# Patient Record
Sex: MALE | Race: WHITE | Employment: PART TIME | URBAN - METROPOLITAN AREA
[De-identification: names, ages, dates, MRNs, and addresses within clinical notes are randomized per-mention and may not be internally consistent; named-entity substitution may affect disease eponyms.]

---

## 2017-01-31 ENCOUNTER — TELEPHONE (OUTPATIENT)
Dept: CARDIOLOGY CLINIC | Age: 77
End: 2017-01-31

## 2017-01-31 RX ORDER — ATORVASTATIN CALCIUM 10 MG/1
TABLET, FILM COATED ORAL
Qty: 45 TABLET | Refills: 3 | Status: SHIPPED | OUTPATIENT
Start: 2017-01-31 | End: 2017-12-28 | Stop reason: SDUPTHER

## 2017-02-15 RX ORDER — LISINOPRIL 20 MG/1
TABLET ORAL
Qty: 90 TABLET | Refills: 3 | Status: SHIPPED | OUTPATIENT
Start: 2017-02-15 | End: 2017-03-09 | Stop reason: SDUPTHER

## 2017-03-09 ENCOUNTER — TELEPHONE (OUTPATIENT)
Dept: CARDIOLOGY CLINIC | Age: 77
End: 2017-03-09

## 2017-03-09 RX ORDER — LISINOPRIL 20 MG/1
TABLET ORAL
Qty: 90 TABLET | Refills: 3 | Status: SHIPPED | OUTPATIENT
Start: 2017-03-09 | End: 2017-11-28 | Stop reason: SDUPTHER

## 2017-04-25 ENCOUNTER — TELEPHONE (OUTPATIENT)
Dept: CARDIOLOGY CLINIC | Age: 77
End: 2017-04-25

## 2017-04-27 RX ORDER — CLOPIDOGREL BISULFATE 75 MG/1
TABLET ORAL
Qty: 90 TABLET | Refills: 2 | Status: SHIPPED | OUTPATIENT
Start: 2017-04-27 | End: 2018-02-01 | Stop reason: SDUPTHER

## 2017-10-05 NOTE — TELEPHONE ENCOUNTER
Last ov 12/15/16  Pending appt due in december  Last refill 11/16/16 #180x3  Last labs 12/15/16  Last ekg 12/15/16

## 2017-11-28 PROBLEM — K46.0 INCARCERATED HERNIA: Status: ACTIVE | Noted: 2017-11-28

## 2017-12-14 ENCOUNTER — OFFICE VISIT (OUTPATIENT)
Dept: FAMILY MEDICINE CLINIC | Age: 77
End: 2017-12-14

## 2017-12-14 VITALS
BODY MASS INDEX: 21.4 KG/M2 | RESPIRATION RATE: 14 BRPM | OXYGEN SATURATION: 80 % | HEART RATE: 79 BPM | DIASTOLIC BLOOD PRESSURE: 60 MMHG | HEIGHT: 72 IN | WEIGHT: 158 LBS | SYSTOLIC BLOOD PRESSURE: 100 MMHG | TEMPERATURE: 98.6 F

## 2017-12-14 DIAGNOSIS — K40.30 INCARCERATED INGUINAL HERNIA: Primary | ICD-10-CM

## 2017-12-14 DIAGNOSIS — M19.90 ARTHRITIS: ICD-10-CM

## 2017-12-14 PROCEDURE — G8484 FLU IMMUNIZE NO ADMIN: HCPCS | Performed by: FAMILY MEDICINE

## 2017-12-14 PROCEDURE — G8427 DOCREV CUR MEDS BY ELIG CLIN: HCPCS | Performed by: FAMILY MEDICINE

## 2017-12-14 PROCEDURE — 4040F PNEUMOC VAC/ADMIN/RCVD: CPT | Performed by: FAMILY MEDICINE

## 2017-12-14 PROCEDURE — 1111F DSCHRG MED/CURRENT MED MERGE: CPT | Performed by: FAMILY MEDICINE

## 2017-12-14 PROCEDURE — G8420 CALC BMI NORM PARAMETERS: HCPCS | Performed by: FAMILY MEDICINE

## 2017-12-14 PROCEDURE — 99213 OFFICE O/P EST LOW 20 MIN: CPT | Performed by: FAMILY MEDICINE

## 2017-12-14 ASSESSMENT — PATIENT HEALTH QUESTIONNAIRE - PHQ9
SUM OF ALL RESPONSES TO PHQ9 QUESTIONS 1 & 2: 0
2. FEELING DOWN, DEPRESSED OR HOPELESS: 0
1. LITTLE INTEREST OR PLEASURE IN DOING THINGS: 0
SUM OF ALL RESPONSES TO PHQ QUESTIONS 1-9: 0

## 2017-12-14 NOTE — PROGRESS NOTES
158 lb (71.7 kg)   SpO2 (!) 80%   BMI 21.43 kg/m²     Physical Exam   General:  Well-appearing, NAD, alert, non-toxic  HEENT:  Normocephalic, atraumatic. Pupils equal and round. CHEST/LUNGS: CTAB, no crackles, no wheeze, no rhonchi. Symmetric rise  CARDIOVASCULAR: RRR,  no murmur, no rub  EXTREMETIES: Normal movement of all extremities  SKIN:  +healing 6cm incision in R groin. No drainage or surrounding erythema  PSYCH:  A+O x 3; normal affect  NEURO:  GCS 15, CN2-12 grossly intact, no focal motor/sensory deficits, no cerebellar deficits, +antalgic gait, normal speech        Assessment/Plan     76-year-old male here for postop examination for right inguinal hernia repair. Postop course seems appropriate with pain improving steadily. Patient has left hip pain which hurts with walking. I offered to refer the patient to or so, the patient declined. Does not want further workup of the hip. I will give him a handicap parking placard. Tylenol or Motrin as needed for pain or fever  Advise to return here if worse or go to nearest ER  Encourage fluids  Pt discharged in stable condition at 14:06      1. Incarcerated inguinal hernia      2. Arthritis        No orders of the defined types were placed in this encounter. No Follow-up on file.     Misha Torres MD    12/14/2017  2:03 PM

## 2017-12-21 ENCOUNTER — OFFICE VISIT (OUTPATIENT)
Dept: SURGERY | Age: 77
End: 2017-12-21

## 2017-12-21 VITALS — DIASTOLIC BLOOD PRESSURE: 62 MMHG | BODY MASS INDEX: 21.43 KG/M2 | WEIGHT: 158 LBS | SYSTOLIC BLOOD PRESSURE: 120 MMHG

## 2017-12-21 DIAGNOSIS — K46.0 INCARCERATED HERNIA: Primary | ICD-10-CM

## 2017-12-21 DIAGNOSIS — K56.609 SBO (SMALL BOWEL OBSTRUCTION) (HCC): ICD-10-CM

## 2017-12-21 PROCEDURE — 99024 POSTOP FOLLOW-UP VISIT: CPT | Performed by: SURGERY

## 2017-12-21 NOTE — LETTER
[facility]  [address]  Phone: [phone]  Fax: [phone]    Ameena Butler MD        December 21, 2017     Patient: Brady Casillas   YOB: 1940   Date of Visit: 12/21/2017       To Whom It May Concern:     Patrice Hannon had a surgical procedure on 12-4-17. He was seen for a post operative appointment on 12-21-17. He may return back to work on 2-5-18 with the following restrictions. No lifting, tugging, pushing, or pulling over 10 pounds until 3-1-18. If you have any questions or concerns, please don't hesitate to call.     Sincerely,          Ameena Butler MD

## 2017-12-28 RX ORDER — ATORVASTATIN CALCIUM 10 MG/1
TABLET, FILM COATED ORAL
Qty: 39 TABLET | Refills: 5 | Status: ON HOLD | OUTPATIENT
Start: 2017-12-28 | End: 2018-01-23 | Stop reason: HOSPADM

## 2018-01-10 ENCOUNTER — TELEPHONE (OUTPATIENT)
Dept: ORTHOPEDIC SURGERY | Age: 78
End: 2018-01-10

## 2018-01-10 PROBLEM — S72.002A LEFT DISPLACED FEMORAL NECK FRACTURE (HCC): Status: ACTIVE | Noted: 2018-01-10

## 2018-01-15 PROBLEM — S72.92XA CLOSED FRACTURE OF LEFT FEMUR (HCC): Status: ACTIVE | Noted: 2018-01-15

## 2018-01-25 ENCOUNTER — CARE COORDINATION (OUTPATIENT)
Dept: CASE MANAGEMENT | Age: 78
End: 2018-01-25

## 2018-01-30 ENCOUNTER — CARE COORDINATION (OUTPATIENT)
Dept: CASE MANAGEMENT | Age: 78
End: 2018-01-30

## 2018-02-01 RX ORDER — CLOPIDOGREL BISULFATE 75 MG/1
TABLET ORAL
Qty: 90 TABLET | Refills: 0 | Status: SHIPPED | OUTPATIENT
Start: 2018-02-01 | End: 2018-05-07 | Stop reason: SDUPTHER

## 2018-02-09 ENCOUNTER — OFFICE VISIT (OUTPATIENT)
Dept: ORTHOPEDIC SURGERY | Age: 78
End: 2018-02-09

## 2018-02-09 ENCOUNTER — CARE COORDINATION (OUTPATIENT)
Dept: CASE MANAGEMENT | Age: 78
End: 2018-02-09

## 2018-02-09 VITALS
SYSTOLIC BLOOD PRESSURE: 130 MMHG | DIASTOLIC BLOOD PRESSURE: 76 MMHG | BODY MASS INDEX: 21.4 KG/M2 | WEIGHT: 158 LBS | HEIGHT: 72 IN | HEART RATE: 84 BPM

## 2018-02-09 DIAGNOSIS — Z87.81 HISTORY OF FEMUR FRACTURE: ICD-10-CM

## 2018-02-09 DIAGNOSIS — Z96.642 STATUS POST TOTAL REPLACEMENT OF LEFT HIP: Primary | ICD-10-CM

## 2018-02-09 PROCEDURE — 99024 POSTOP FOLLOW-UP VISIT: CPT | Performed by: ORTHOPAEDIC SURGERY

## 2018-02-09 RX ORDER — OXYCODONE HYDROCHLORIDE AND ACETAMINOPHEN 5; 325 MG/1; MG/1
TABLET ORAL
Qty: 25 TABLET | Refills: 0 | Status: SHIPPED | OUTPATIENT
Start: 2018-02-09 | End: 2018-02-23

## 2018-02-09 NOTE — PROGRESS NOTES
[facility]      At this time, he will continue physical therapy and transition to outpatient physical therapy in 2 weeks. He can progress to 100% weightbearing on the left leg. He is encouraged to continue to progress to a cane but is advised to continue using a walker if he feels it is necessary to be safe. He will be given a refill on his Percocet. Follow up will be in in 4 week(s) and an AP pelvis and 2 views of the left hip will be obtained. He understands and accepts this course of care     During this examination, BARNEY Singh PA-C, functioned as a scribe for Dr. Nemo Meneses. The history taking and physical examination were performed by Dr. Nemo Meneses. The appointment was performed between the patient and Dr. Nemo Meneses. The above encounter was performed by me personally with Robbie Singh PA-C serving solely as a scribe. The above note is an accurate reflection of that encounter and has been reviewed for content by me. Chantelle Yang, [address] [place] [address] and Sports Medicine  02/09/18  10:27 PM

## 2018-02-09 NOTE — LETTER
[address] [address]  Phone: [phone]  Fax: [phone]    No ref. provider found        February 9, 2018       Patient: Deion Shah   MR Number: [id number]   YOB: 1940   Date of Visit: 2/9/2018       Dear Dr. Vandana Kumar ref. provider found: Thank you for the request for consultation for Quentin Stacy to me for the evaluation of left hip replacement for fracture. Below are the relevant portions of my assessment and plan of care. If you have questions, please do not hesitate to call me. I look forward to following Sur[facility] along with you. Sincerely,        Myrna Hyatt MD    CC providers:  Velasquez Chao MD  [address]

## 2018-02-09 NOTE — CARE COORDINATION
[facility] Transitions Follow Up Call    2018    Patient: Andrew Diaz  Patient : 1940   MRN: [id number]  Reason for Admission: left SG  Discharge Date: 18 RARS: Risk Score: 20.75     Follow up [address] call attempted, left contact info on vm      Follow Up  Future Appointments  Date Time Provider Maia Noel   3/9/2018 11:00 AM Casie Calle MD [facility] HUSSEIN Sanz RN

## 2018-02-13 ENCOUNTER — CARE COORDINATION (OUTPATIENT)
Dept: CASE MANAGEMENT | Age: 78
End: 2018-02-13

## 2018-02-15 RX ORDER — LISINOPRIL 20 MG/1
TABLET ORAL
Qty: 90 TABLET | Refills: 0 | Status: SHIPPED | OUTPATIENT
Start: 2018-02-15 | End: 2019-01-11

## 2018-03-01 ENCOUNTER — CARE COORDINATION (OUTPATIENT)
Dept: CASE MANAGEMENT | Age: 78
End: 2018-03-01

## 2018-03-01 NOTE — CARE COORDINATION
[facility] Transitions Follow Up Call    3/1/2018    Patient: Andrew Diaz  Patient : 1940   MRN: [id number]  Reason for Admission: Left GS  Discharge Date: 18 RARS: Risk Score: 20.75       Follow up [address] call attempted, left contact info on vm      Follow Up  Future Appointments  Date Time Provider Maia Noel   3/8/2018 2:30 PM Seema Knee, PT [facility] PT None   3/13/2018 1:15 PM Casie Calle MD [facility] HUSSEIN Sanz RN

## 2018-03-13 ENCOUNTER — OFFICE VISIT (OUTPATIENT)
Dept: ORTHOPEDIC SURGERY | Age: 78
End: 2018-03-13

## 2018-03-13 VITALS
DIASTOLIC BLOOD PRESSURE: 73 MMHG | SYSTOLIC BLOOD PRESSURE: 137 MMHG | BODY MASS INDEX: 22.05 KG/M2 | HEIGHT: 72 IN | HEART RATE: 60 BPM | WEIGHT: 162.8 LBS

## 2018-03-13 DIAGNOSIS — Z87.81 HISTORY OF FEMUR FRACTURE: ICD-10-CM

## 2018-03-13 DIAGNOSIS — Z96.642 STATUS POST TOTAL REPLACEMENT OF LEFT HIP: Primary | ICD-10-CM

## 2018-03-13 PROCEDURE — 99024 POSTOP FOLLOW-UP VISIT: CPT | Performed by: PHYSICIAN ASSISTANT

## 2018-03-13 NOTE — PROGRESS NOTES
Patient Name: Kia Mariscal  Medical Record Number: [id number]  YOB: 1940  Date of Encounter: 3/13/2018    Chief Complaint   Patient presents with    Post-Op Check     Left SG follow up. Doing well, hip is still sore often. Total Hip Follow-up  Patient here for 8 weeks post left direct anterior total hip arthroplasty following a left hip intertrochanteric fracture with severe hip osteoarthritis on 1/12/2018. Pain is controlled without any medications. The patient denies  fever, wound drainage, increasing redness, pus, increasing pain, increasing swelling. Post op problems reported: none. He is ambulating with the assistance of a cane and states he does sometimes still using a walker. His hip fracture was secondary to a syncopal episode and fall. He was in the hospital for several days following the operation. He states he was doing at home physical therapy until 2 weeks ago. He has since canceled to outpatient physical therapy sessions and his wife states he does not feel he needs to go. She has been trying to talk him into going. He states surgical wound is well-healed. He denies numbness or tingling in the left leg. Patient is still taking Vitamin D supplementation. DVT prophylaxis is completed. The patient's  past medical history, medications, allergies,  family history, social history, and review of systems have been reviewed, and dated and are recorded in the chart under the 'MEDIA\" tab. Physical Exam:   Mr. Kia Mariscal appears well, he is in no apparent distress, he demonstrates appropriate mood & affect. He is alert and oriented to person, place and time. /73   Pulse 60   Ht 6' (1.829 m)   Wt 162 lb 12.8 oz (73.8 kg)   BMI 22.08 kg/m²     Left Hip: He has minimal swelling. The incision is clean, dry, intact and nontender and without erythema, induration or warmth.  Range of motion is: 40 degrees abduction, 95 degrees flexion, 35 degrees internal rotation

## 2018-03-13 NOTE — LETTER
[address] [address]  Phone: [phone]  Fax: [phone]     Dr. Gareth Ríos     March 13, 2018       Patient: Ray Coreas   MR Number: [id number]   YOB: 1940   Date of Visit: 3/13/2018       Dear Dr. Gareth Ríos    Thank you for the request for consultation for Jose Salinas to me for the evaluation of his left hip fracture with subsequent total hip arthroplasty. Below are the relevant portions of my assessment and plan of care. If you have questions, please do not hesitate to call me. I look forward to following [facility] along with you. Sincerely,    Dr. Ely Torres PA-C    CC providers:  Nadia Peck MD  [address]

## 2018-03-14 ENCOUNTER — CARE COORDINATION (OUTPATIENT)
Dept: CASE MANAGEMENT | Age: 78
End: 2018-03-14

## 2018-03-23 ENCOUNTER — CARE COORDINATION (OUTPATIENT)
Dept: CASE MANAGEMENT | Age: 78
End: 2018-03-23

## 2018-04-09 ENCOUNTER — CARE COORDINATION (OUTPATIENT)
Dept: CASE MANAGEMENT | Age: 78
End: 2018-04-09

## 2018-05-07 RX ORDER — CLOPIDOGREL BISULFATE 75 MG/1
TABLET ORAL
Qty: 90 TABLET | Refills: 0 | Status: SHIPPED | OUTPATIENT
Start: 2018-05-07 | End: 2018-08-05 | Stop reason: SDUPTHER

## 2018-05-07 RX ORDER — LISINOPRIL 20 MG/1
TABLET ORAL
Qty: 90 TABLET | Refills: 0 | Status: SHIPPED | OUTPATIENT
Start: 2018-05-07 | End: 2019-01-11

## 2018-08-06 RX ORDER — CLOPIDOGREL BISULFATE 75 MG/1
TABLET ORAL
Qty: 90 TABLET | Refills: 5 | Status: SHIPPED | OUTPATIENT
Start: 2018-08-06

## 2018-08-27 RX ORDER — LISINOPRIL 20 MG/1
TABLET ORAL
Qty: 90 TABLET | OUTPATIENT
Start: 2018-08-27

## 2018-10-10 RX ORDER — LISINOPRIL 20 MG/1
TABLET ORAL
Qty: 90 TABLET | Refills: 0 | Status: SHIPPED | OUTPATIENT
Start: 2018-10-10 | End: 2019-01-11 | Stop reason: SDUPTHER

## 2018-12-12 ENCOUNTER — TELEPHONE (OUTPATIENT)
Dept: CARDIOLOGY CLINIC | Age: 78
End: 2018-12-12

## 2019-01-11 RX ORDER — LISINOPRIL 20 MG/1
TABLET ORAL
Qty: 90 TABLET | Refills: 1 | Status: SHIPPED | OUTPATIENT
Start: 2019-01-11

## 2019-05-03 DIAGNOSIS — I10 ESSENTIAL HYPERTENSION: Primary | ICD-10-CM

## 2019-05-03 NOTE — TELEPHONE ENCOUNTER
Has canceled all follow up appts after medications have been refilled to the pharmacy --- was told on all refills needs to be seen     Last appt was 12/15/2016    Received a medication refill by faxed from [address] [place] [address] for Prinivil 20mg  Denied due to pt needing an office visit and faxed back. .     Please advise if you would like for him to have a 10 day supply or 30 day until he is seen in office

## 2019-05-03 NOTE — TELEPHONE ENCOUNTER
Discussed with dgb.he needs fasting LLL prior to getting a refill. If his labs are ok, will give him a refill. Needs to make an appointment, and be seen to get any further refills from this office.  Would advise he make an appointment now to be seen  dgb/MM

## 2024-05-28 ENCOUNTER — APPOINTMENT (OUTPATIENT)
Dept: GENERAL RADIOLOGY | Age: 84
End: 2024-05-28
Payer: MEDICARE

## 2024-05-28 ENCOUNTER — APPOINTMENT (OUTPATIENT)
Dept: CT IMAGING | Age: 84
End: 2024-05-28
Payer: MEDICARE

## 2024-05-28 ENCOUNTER — HOSPITAL ENCOUNTER (INPATIENT)
Age: 84
LOS: 3 days | Discharge: HOSPICE | End: 2024-05-31
Attending: INTERNAL MEDICINE | Admitting: INTERNAL MEDICINE
Payer: MEDICARE

## 2024-05-28 ENCOUNTER — APPOINTMENT (OUTPATIENT)
Dept: ULTRASOUND IMAGING | Age: 84
End: 2024-05-28
Payer: MEDICARE

## 2024-05-28 DIAGNOSIS — R29.6 FREQUENT FALLS: ICD-10-CM

## 2024-05-28 DIAGNOSIS — R74.01 TRANSAMINITIS: ICD-10-CM

## 2024-05-28 DIAGNOSIS — R53.1 GENERALIZED WEAKNESS: Primary | ICD-10-CM

## 2024-05-28 DIAGNOSIS — Z51.5 HOSPICE CARE: ICD-10-CM

## 2024-05-28 DIAGNOSIS — K83.8 DILATION OF BILIARY TRACT: ICD-10-CM

## 2024-05-28 PROBLEM — J18.9 PNEUMONIA: Status: ACTIVE | Noted: 2024-05-28

## 2024-05-28 PROBLEM — N39.0 UTI (URINARY TRACT INFECTION): Status: ACTIVE | Noted: 2024-05-28

## 2024-05-28 PROBLEM — A41.9 SEPSIS (HCC): Status: ACTIVE | Noted: 2024-05-28

## 2024-05-28 PROBLEM — K81.9 CHOLECYSTITIS: Status: ACTIVE | Noted: 2024-05-28

## 2024-05-28 PROBLEM — F03.90 DEMENTIA (HCC): Status: ACTIVE | Noted: 2024-05-28

## 2024-05-28 LAB
ALBUMIN SERPL-MCNC: 3.2 G/DL (ref 3.4–5)
ALBUMIN/GLOB SERPL: 1.1 {RATIO} (ref 1.1–2.2)
ALP SERPL-CCNC: 542 U/L (ref 40–129)
ALT SERPL-CCNC: 96 U/L (ref 10–40)
AMMONIA PLAS-SCNC: NORMAL UMOL/L (ref 16–60)
ANION GAP SERPL CALCULATED.3IONS-SCNC: 13 MMOL/L (ref 3–16)
AST SERPL-CCNC: 102 U/L (ref 15–37)
BACTERIA URNS QL MICRO: ABNORMAL /HPF
BASOPHILS # BLD: 0 K/UL (ref 0–0.2)
BASOPHILS NFR BLD: 0.2 %
BILIRUB SERPL-MCNC: 4.6 MG/DL (ref 0–1)
BILIRUB UR QL STRIP.AUTO: ABNORMAL
BUN SERPL-MCNC: 14 MG/DL (ref 7–20)
CALCIUM SERPL-MCNC: 8.7 MG/DL (ref 8.3–10.6)
CHLORIDE SERPL-SCNC: 100 MMOL/L (ref 99–110)
CLARITY UR: CLEAR
CO2 SERPL-SCNC: 24 MMOL/L (ref 21–32)
COLOR UR: ABNORMAL
CREAT SERPL-MCNC: <0.5 MG/DL (ref 0.8–1.3)
DEPRECATED RDW RBC AUTO: 17.3 % (ref 12.4–15.4)
EOSINOPHIL # BLD: 0 K/UL (ref 0–0.6)
EOSINOPHIL NFR BLD: 0 %
EPI CELLS #/AREA URNS AUTO: 6 /HPF (ref 0–5)
GFR SERPLBLD CREATININE-BSD FMLA CKD-EPI: >90 ML/MIN/{1.73_M2}
GLUCOSE SERPL-MCNC: 119 MG/DL (ref 70–99)
GLUCOSE UR STRIP.AUTO-MCNC: NEGATIVE MG/DL
HCT VFR BLD AUTO: 39.5 % (ref 40.5–52.5)
HGB BLD-MCNC: 13.3 G/DL (ref 13.5–17.5)
HGB UR QL STRIP.AUTO: NEGATIVE
HYALINE CASTS #/AREA URNS AUTO: 1 /LPF (ref 0–8)
KETONES UR STRIP.AUTO-MCNC: 40 MG/DL
LACTATE BLDV-SCNC: 1.3 MMOL/L (ref 0.4–1.9)
LACTATE BLDV-SCNC: 2.4 MMOL/L (ref 0.4–1.9)
LEUKOCYTE ESTERASE UR QL STRIP.AUTO: ABNORMAL
LYMPHOCYTES # BLD: 0.1 K/UL (ref 1–5.1)
LYMPHOCYTES NFR BLD: 1.6 %
MCH RBC QN AUTO: 30.4 PG (ref 26–34)
MCHC RBC AUTO-ENTMCNC: 33.8 G/DL (ref 31–36)
MCV RBC AUTO: 90 FL (ref 80–100)
MONOCYTES # BLD: 0.7 K/UL (ref 0–1.3)
MONOCYTES NFR BLD: 7.1 %
NEUTROPHILS # BLD: 8.8 K/UL (ref 1.7–7.7)
NEUTROPHILS NFR BLD: 91.1 %
NITRITE UR QL STRIP.AUTO: POSITIVE
PH UR STRIP.AUTO: 5.5 [PH] (ref 5–8)
PLATELET # BLD AUTO: 235 K/UL (ref 135–450)
PMV BLD AUTO: 8.2 FL (ref 5–10.5)
POTASSIUM SERPL-SCNC: 4 MMOL/L (ref 3.5–5.1)
PROCALCITONIN SERPL IA-MCNC: 0.44 NG/ML (ref 0–0.15)
PROT SERPL-MCNC: 6.1 G/DL (ref 6.4–8.2)
PROT UR STRIP.AUTO-MCNC: 30 MG/DL
RBC # BLD AUTO: 4.39 M/UL (ref 4.2–5.9)
RBC CLUMPS #/AREA URNS AUTO: 3 /HPF (ref 0–4)
SODIUM SERPL-SCNC: 137 MMOL/L (ref 136–145)
SP GR UR STRIP.AUTO: 1.02 (ref 1–1.03)
TROPONIN, HIGH SENSITIVITY: 24 NG/L (ref 0–22)
TROPONIN, HIGH SENSITIVITY: 34 NG/L (ref 0–22)
UA COMPLETE W REFLEX CULTURE PNL UR: ABNORMAL
UA DIPSTICK W REFLEX MICRO PNL UR: YES
URN SPEC COLLECT METH UR: ABNORMAL
UROBILINOGEN UR STRIP-ACNC: 4 E.U./DL
WBC # BLD AUTO: 9.6 K/UL (ref 4–11)
WBC #/AREA URNS AUTO: 2 /HPF (ref 0–5)

## 2024-05-28 PROCEDURE — 76705 ECHO EXAM OF ABDOMEN: CPT

## 2024-05-28 PROCEDURE — 36415 COLL VENOUS BLD VENIPUNCTURE: CPT

## 2024-05-28 PROCEDURE — 80053 COMPREHEN METABOLIC PANEL: CPT

## 2024-05-28 PROCEDURE — 2580000003 HC RX 258: Performed by: INTERNAL MEDICINE

## 2024-05-28 PROCEDURE — 83605 ASSAY OF LACTIC ACID: CPT

## 2024-05-28 PROCEDURE — 1200000000 HC SEMI PRIVATE

## 2024-05-28 PROCEDURE — 84145 PROCALCITONIN (PCT): CPT

## 2024-05-28 PROCEDURE — 6370000000 HC RX 637 (ALT 250 FOR IP): Performed by: INTERNAL MEDICINE

## 2024-05-28 PROCEDURE — 96360 HYDRATION IV INFUSION INIT: CPT

## 2024-05-28 PROCEDURE — 71250 CT THORAX DX C-: CPT

## 2024-05-28 PROCEDURE — 70450 CT HEAD/BRAIN W/O DYE: CPT

## 2024-05-28 PROCEDURE — 81001 URINALYSIS AUTO W/SCOPE: CPT

## 2024-05-28 PROCEDURE — 93005 ELECTROCARDIOGRAM TRACING: CPT | Performed by: INTERNAL MEDICINE

## 2024-05-28 PROCEDURE — 84484 ASSAY OF TROPONIN QUANT: CPT

## 2024-05-28 PROCEDURE — 99285 EMERGENCY DEPT VISIT HI MDM: CPT

## 2024-05-28 PROCEDURE — 85025 COMPLETE CBC W/AUTO DIFF WBC: CPT

## 2024-05-28 PROCEDURE — 6360000002 HC RX W HCPCS: Performed by: INTERNAL MEDICINE

## 2024-05-28 PROCEDURE — 2580000003 HC RX 258: Performed by: PHYSICIAN ASSISTANT

## 2024-05-28 PROCEDURE — 6360000002 HC RX W HCPCS: Performed by: PHYSICIAN ASSISTANT

## 2024-05-28 PROCEDURE — 71045 X-RAY EXAM CHEST 1 VIEW: CPT

## 2024-05-28 PROCEDURE — 82140 ASSAY OF AMMONIA: CPT

## 2024-05-28 RX ORDER — 0.9 % SODIUM CHLORIDE 0.9 %
500 INTRAVENOUS SOLUTION INTRAVENOUS ONCE
Status: COMPLETED | OUTPATIENT
Start: 2024-05-28 | End: 2024-05-28

## 2024-05-28 RX ORDER — ACETAMINOPHEN 325 MG/1
650 TABLET ORAL EVERY 6 HOURS PRN
Status: DISCONTINUED | OUTPATIENT
Start: 2024-05-28 | End: 2024-05-31 | Stop reason: HOSPADM

## 2024-05-28 RX ORDER — ACETAMINOPHEN 650 MG/1
650 SUPPOSITORY RECTAL EVERY 6 HOURS PRN
Status: DISCONTINUED | OUTPATIENT
Start: 2024-05-28 | End: 2024-05-31 | Stop reason: HOSPADM

## 2024-05-28 RX ORDER — 0.9 % SODIUM CHLORIDE 0.9 %
500 INTRAVENOUS SOLUTION INTRAVENOUS PRN
Status: DISCONTINUED | OUTPATIENT
Start: 2024-05-28 | End: 2024-05-31 | Stop reason: HOSPADM

## 2024-05-28 RX ORDER — SODIUM CHLORIDE 9 MG/ML
INJECTION, SOLUTION INTRAVENOUS PRN
Status: DISCONTINUED | OUTPATIENT
Start: 2024-05-28 | End: 2024-05-31 | Stop reason: HOSPADM

## 2024-05-28 RX ORDER — ONDANSETRON 2 MG/ML
4 INJECTION INTRAMUSCULAR; INTRAVENOUS EVERY 6 HOURS PRN
Status: DISCONTINUED | OUTPATIENT
Start: 2024-05-28 | End: 2024-05-31 | Stop reason: HOSPADM

## 2024-05-28 RX ORDER — POTASSIUM CHLORIDE 20 MEQ/1
40 TABLET, EXTENDED RELEASE ORAL PRN
Status: DISCONTINUED | OUTPATIENT
Start: 2024-05-28 | End: 2024-05-31 | Stop reason: HOSPADM

## 2024-05-28 RX ORDER — IPRATROPIUM BROMIDE AND ALBUTEROL SULFATE 2.5; .5 MG/3ML; MG/3ML
1 SOLUTION RESPIRATORY (INHALATION)
Status: DISCONTINUED | OUTPATIENT
Start: 2024-05-29 | End: 2024-05-29

## 2024-05-28 RX ORDER — SODIUM CHLORIDE 9 MG/ML
INJECTION, SOLUTION INTRAVENOUS CONTINUOUS
Status: DISCONTINUED | OUTPATIENT
Start: 2024-05-28 | End: 2024-05-31 | Stop reason: HOSPADM

## 2024-05-28 RX ORDER — SODIUM CHLORIDE 0.9 % (FLUSH) 0.9 %
10 SYRINGE (ML) INJECTION PRN
Status: DISCONTINUED | OUTPATIENT
Start: 2024-05-28 | End: 2024-05-31 | Stop reason: HOSPADM

## 2024-05-28 RX ORDER — SODIUM CHLORIDE 0.9 % (FLUSH) 0.9 %
5-40 SYRINGE (ML) INJECTION EVERY 12 HOURS SCHEDULED
Status: DISCONTINUED | OUTPATIENT
Start: 2024-05-28 | End: 2024-05-31 | Stop reason: HOSPADM

## 2024-05-28 RX ORDER — HYDRALAZINE HYDROCHLORIDE 20 MG/ML
10 INJECTION INTRAMUSCULAR; INTRAVENOUS EVERY 6 HOURS PRN
Status: DISCONTINUED | OUTPATIENT
Start: 2024-05-28 | End: 2024-05-31 | Stop reason: HOSPADM

## 2024-05-28 RX ORDER — ONDANSETRON 4 MG/1
4 TABLET, ORALLY DISINTEGRATING ORAL EVERY 8 HOURS PRN
Status: DISCONTINUED | OUTPATIENT
Start: 2024-05-28 | End: 2024-05-31 | Stop reason: HOSPADM

## 2024-05-28 RX ORDER — ENOXAPARIN SODIUM 100 MG/ML
40 INJECTION SUBCUTANEOUS DAILY
Status: DISCONTINUED | OUTPATIENT
Start: 2024-05-29 | End: 2024-05-29

## 2024-05-28 RX ORDER — POTASSIUM CHLORIDE 7.45 MG/ML
10 INJECTION INTRAVENOUS PRN
Status: DISCONTINUED | OUTPATIENT
Start: 2024-05-28 | End: 2024-05-31 | Stop reason: HOSPADM

## 2024-05-28 RX ADMIN — PIPERACILLIN AND TAZOBACTAM 4500 MG: 4; .5 INJECTION, POWDER, FOR SOLUTION INTRAVENOUS at 22:09

## 2024-05-28 RX ADMIN — SODIUM CHLORIDE, PRESERVATIVE FREE 10 ML: 5 INJECTION INTRAVENOUS at 21:18

## 2024-05-28 RX ADMIN — SODIUM CHLORIDE: 9 INJECTION, SOLUTION INTRAVENOUS at 21:17

## 2024-05-28 RX ADMIN — SODIUM CHLORIDE 500 ML: 9 INJECTION, SOLUTION INTRAVENOUS at 16:40

## 2024-05-28 RX ADMIN — WATER 1000 MG: 1 INJECTION INTRAMUSCULAR; INTRAVENOUS; SUBCUTANEOUS at 19:47

## 2024-05-28 RX ADMIN — ACETAMINOPHEN 650 MG: 325 TABLET ORAL at 21:19

## 2024-05-28 ASSESSMENT — ENCOUNTER SYMPTOMS
ABDOMINAL PAIN: 0
VOMITING: 0
RHINORRHEA: 0
CONSTIPATION: 0
EYE DISCHARGE: 0
ANAL BLEEDING: 0
APNEA: 0
DIARRHEA: 0
CHOKING: 0
WHEEZING: 0
NAUSEA: 0
COUGH: 0
SHORTNESS OF BREATH: 0
CHEST TIGHTNESS: 0

## 2024-05-28 ASSESSMENT — PAIN DESCRIPTION - DESCRIPTORS: DESCRIPTORS: ACHING

## 2024-05-28 ASSESSMENT — PAIN SCALES - GENERAL: PAINLEVEL_OUTOF10: 3

## 2024-05-28 ASSESSMENT — PAIN - FUNCTIONAL ASSESSMENT
PAIN_FUNCTIONAL_ASSESSMENT: ACTIVITIES ARE NOT PREVENTED
PAIN_FUNCTIONAL_ASSESSMENT: NONE - DENIES PAIN

## 2024-05-28 ASSESSMENT — PAIN DESCRIPTION - LOCATION: LOCATION: NECK

## 2024-05-28 ASSESSMENT — LIFESTYLE VARIABLES: HOW OFTEN DO YOU HAVE A DRINK CONTAINING ALCOHOL: NEVER

## 2024-05-28 NOTE — PROGRESS NOTES
Pharmacy Home Medication Reconciliation Note    A medication reconciliation has been completed for Severino Brown 1940    Pharmacy: [facility] [address], [place]  Information provided by: patient's wife    The patient's home medication list is as follows:  No current facility-administered medications on file prior to encounter.     Current Outpatient Medications on File Prior to Encounter   Medication Sig Dispense Refill    lisinopril (PRINIVIL;ZESTRIL) 20 MG tablet TAKE 1 TABLET BY MOUTH  DAILY (Patient not taking: Reported on 5/28/2024) 90 tablet 1    metoprolol tartrate (LOPRESSOR) 25 MG tablet Take 1 tablet by mouth 2 times daily (Patient not taking: Reported on 5/28/2024) 180 tablet 3    clopidogrel (PLAVIX) 75 MG tablet TAKE 1 TABLET BY MOUTH  DAILY (Patient not taking: Reported on 5/28/2024) 90 tablet 5    iron polysaccharides (NIFEREX) 150 MG capsule Take 1 capsule by mouth daily for 14 days (Patient not taking: Reported on 5/28/2024) 14 capsule 0    aspirin (ALFRED ASPIRIN) 325 MG tablet Take 1 tablet by mouth daily for 14 days (Patient not taking: Reported on 5/28/2024) 14 tablet 0    atorvastatin (LIPITOR) 20 MG tablet Take 1 tablet by mouth daily (Patient not taking: Reported on 5/28/2024) 30 tablet 3    furosemide (LASIX) 20 MG tablet Take 1 tablet by mouth daily (Patient not taking: Reported on 5/28/2024) 14 tablet 0    sennosides-docusate sodium (SENOKOT-S) 8.6-50 MG tablet Take 2 tablets by mouth daily (Patient not taking: Reported on 5/28/2024) 60 tablet 0    ranitidine (ZANTAC) 150 MG tablet Take 150 mg by mouth 2 times daily.   (Patient not taking: Reported on 5/28/2024)         Patient is no longer taking any medications. Patient's wife states he \"went cold turkey\" on all medical care when his doctor retired in 2019.      Timing of last doses updated.    Thank you,  Camila Kurtz, hT

## 2024-05-28 NOTE — PROGRESS NOTES
Pt admitted for sepsis, pna vs GB dz.    Full h+p to follow    Active Hospital Problems    Diagnosis Date Noted    Dementia (HCC) [F03.90] 05/28/2024    Transaminitis [R74.01] 05/28/2024    Cholecystitis [K81.9] 05/28/2024    Sepsis (HCC) [A41.9] 05/28/2024    Pneumonia [J18.9] 05/28/2024       Please use [employer] to contact me with any questions during the day.   The hospitalist service will provide cross-coverage for this patient from 7pm to 7am.    During those hours, contact the on-call hospitalist MD/YASMEEN for questions.

## 2024-05-28 NOTE — ED PROVIDER NOTES
[facility]  EMERGENCY DEPARTMENT ENCOUNTER        Pt Name: Severino Brown  MRN: [id number]  Birthdate 1940  Date of evaluation: 5/28/2024  Provider: Lyric Brasher PA-C  PCP: No primary care provider on file.  Note Started: 4:07 PM EDT 5/28/24      YASMEEN. I have evaluated this patient.        CHIEF COMPLAINT       Chief Complaint   Patient presents with    Fatigue     Pt arrived to ED via [facility] from home with c/o weakness over the past month pt has had multiple mechanical falls where his legs give out. Pt is oriented to self but confused to situation and date. Per pt wife, this is baseline. Per pt wife, pt takes no meds and has not been to doctor since 2018 when he broke his hip.        HISTORY OF PRESENT ILLNESS: 1 or more Elements     History From: wife  Limitations to history : None    Severino Brown is a 84 y.o. male who presents via EMS for complaints of weakness.  Patient presents with wife for complaints of weakness and falls for the last couple months.  Patient has not seen a doctor since 2018 when he fell and sustained a left hip fracture.  His doctor then retired, and the patient stopped seeing his primary.  The patient stopped all of his home medications, refusing to leave the house.  His wife states that he began to become altered, with increasing weakness at home.  Patient has had multiple falls in the last few months, 1 to 2 weeks ago, and 1 today where his wife lowered him to the floor.  She states that he has a history of cardiac surgeries, hypertension.  States he does have a history of prostate cancer.  States last 2 weeks he has become incontinent of urine.  Wife states that he has also had recent weight loss.  Patient used to really like eating food, now will only eat a few bites of each meal and states that he is full.  She said recently has been very confused, states that he was trying to drink a glass of water recently and could not remember how to lift a glass to his

## 2024-05-28 NOTE — H&P
History and Physical  Dr. Izaguirre  5/28/2024    PCP: No primary care provider on file.    Cc:   Chief Complaint   Patient presents with    Fatigue     Pt arrived to ED via [facility] from home with c/o weakness over the past month pt has had multiple mechanical falls where his legs give out. Pt is oriented to self but confused to situation and date. Per pt wife, this is baseline. Per pt wife, pt takes no meds and has not been to doctor since 2018 when he broke his hip.        HPI:  Severino Brown is a 84 y.o. male who has a past medical history of CAD (coronary artery disease), Cancer (HCC), Hyperlipidemia, Hypertension, and SBO (small bowel obstruction) ([facility]).     Patient presents with Sepsis ([facility]).  HPI  (1-3 for expanded problem focused, ?4 for detailed/comprehensive)     84 y.o. male who presents via EMS for complaints of weakness.  Patient presents with wife for complaints of weakness and falls for the last couple months.  Patient has not seen a doctor since 2018 when he fell and sustained a left hip fracture.  His doctor then retired, and the patient stopped seeing his primary.  The patient stopped all of his home medications, refusing to leave the house.  His wife states that he began to become altered, with increasing weakness at home.  Patient has had multiple falls in the last few months, 1 to 2 weeks ago, and 1 today where his wife lowered him to the floor.  She states that he has a history of cardiac surgeries, hypertension.  States he does have a history of prostate cancer.  States last 2 weeks he has become incontinent of urine.  Wife states that he has also had recent weight loss.  Patient used to really like eating food, now will only eat a few bites of each meal and states that he is full.  She said recently has been very confused, states that he was trying to drink a glass of water recently and could not remember how to lift a glass to his mouth.  Frequently asks for help on things he used to know how  discuss with external specialist, review/order 3+ tests - cbc bmp u/a cxr ct abd lactate trop    OR TIME BASED  N/A - see problem based determination above           The patient is being placed in inpatient status with the expectation of requiring a hospital stay spanning at least two midnights for care and treatment of the problems noted in the problem list.  This determination is also based on thepatients comorbidities and past medical history, the severity and timing of the signs and symptoms upon presentation.    (Please note that portions of this note were completed with a voice recognition program.  Efforts were made to edit the dictations but occasionally words are mis-transcribed.)      Electronically signed by: Zachery Izaguirre MD 5/28/2024    Please use [employer] to contact me with any questions during the day.   The hospitalist service will provide cross-coverage for this patient from 7pm to 7am.    During those hours, contact the on-call hospitalist MD/YASMEEN for questions.

## 2024-05-28 NOTE — ED NOTES
How does patient ambulate?   []Low Fall Risk (ambulates by themselves without support)  []Stand by assist   []Contact Guard   []Front wheel walker  []Wheelchair   []Steady  []Bed bound  []History of Lower Extremity Amputation  [x]Unknown, did not assess in the emergency department   How does patient take pills?  []Whole with Water  []Crushed in applesauce  []Crushed in pudding  []Other  [x]Unknown no oral medications were given in the ED  Is patient alert?   [x]Alert  []Drowsy but responds to voice  []Doesn't respond to voice but responds to painful stimuli  []Unresponsive  Is patient oriented?   [x]To person  []To place  []To time  []To situation  [x]Confused  []Agitated  []Follows commands  If patient is disoriented or from a Skill Nursing Facility has family been notified of admission?   [x]Yes   []No  Patient belongings?   []Cell phone  []Wallet   []Dentures  [x]Clothing  Any specific patient or family belongings/needs/dynamics?   na  Miscellaneous comments/pending orders?  na    If there are any additional questions please reach out to the Emergency Department.

## 2024-05-29 ENCOUNTER — APPOINTMENT (OUTPATIENT)
Dept: MRI IMAGING | Age: 84
End: 2024-05-29
Payer: MEDICARE

## 2024-05-29 ENCOUNTER — ANESTHESIA (OUTPATIENT)
Dept: ENDOSCOPY | Age: 84
End: 2024-05-29
Payer: MEDICARE

## 2024-05-29 ENCOUNTER — APPOINTMENT (OUTPATIENT)
Dept: GENERAL RADIOLOGY | Age: 84
End: 2024-05-29
Payer: MEDICARE

## 2024-05-29 ENCOUNTER — ANESTHESIA EVENT (OUTPATIENT)
Dept: ENDOSCOPY | Age: 84
End: 2024-05-29
Payer: MEDICARE

## 2024-05-29 LAB
ALBUMIN SERPL-MCNC: 2.7 G/DL (ref 3.4–5)
ALP SERPL-CCNC: 455 U/L (ref 40–129)
ALT SERPL-CCNC: 72 U/L (ref 10–40)
ANION GAP SERPL CALCULATED.3IONS-SCNC: 8 MMOL/L (ref 3–16)
AST SERPL-CCNC: 72 U/L (ref 15–37)
BASOPHILS # BLD: 0 K/UL (ref 0–0.2)
BASOPHILS NFR BLD: 0.1 %
BILIRUB DIRECT SERPL-MCNC: 3 MG/DL (ref 0–0.3)
BILIRUB INDIRECT SERPL-MCNC: 0.8 MG/DL (ref 0–1)
BILIRUB SERPL-MCNC: 3.8 MG/DL (ref 0–1)
BUN SERPL-MCNC: 14 MG/DL (ref 7–20)
CALCIUM SERPL-MCNC: 8.2 MG/DL (ref 8.3–10.6)
CHLORIDE SERPL-SCNC: 107 MMOL/L (ref 99–110)
CO2 SERPL-SCNC: 28 MMOL/L (ref 21–32)
CREAT SERPL-MCNC: 0.7 MG/DL (ref 0.8–1.3)
CRP SERPL-MCNC: 70.8 MG/L (ref 0–5.1)
DEPRECATED RDW RBC AUTO: 17.8 % (ref 12.4–15.4)
EKG ATRIAL RATE: 87 BPM
EKG DIAGNOSIS: NORMAL
EKG P AXIS: 0 DEGREES
EKG P-R INTERVAL: 156 MS
EKG Q-T INTERVAL: 370 MS
EKG QRS DURATION: 70 MS
EKG QTC CALCULATION (BAZETT): 445 MS
EKG R AXIS: 42 DEGREES
EKG T AXIS: 64 DEGREES
EKG VENTRICULAR RATE: 87 BPM
EOSINOPHIL # BLD: 0 K/UL (ref 0–0.6)
EOSINOPHIL NFR BLD: 0.3 %
GFR SERPLBLD CREATININE-BSD FMLA CKD-EPI: >90 ML/MIN/{1.73_M2}
GLUCOSE SERPL-MCNC: 141 MG/DL (ref 70–99)
HCT VFR BLD AUTO: 34.1 % (ref 40.5–52.5)
HGB BLD-MCNC: 11.6 G/DL (ref 13.5–17.5)
LACTATE BLDV-SCNC: 1.6 MMOL/L (ref 0.4–2)
LYMPHOCYTES # BLD: 0.9 K/UL (ref 1–5.1)
LYMPHOCYTES NFR BLD: 13 %
MCH RBC QN AUTO: 30.7 PG (ref 26–34)
MCHC RBC AUTO-ENTMCNC: 34 G/DL (ref 31–36)
MCV RBC AUTO: 90.1 FL (ref 80–100)
MONOCYTES # BLD: 0.6 K/UL (ref 0–1.3)
MONOCYTES NFR BLD: 8.8 %
NEUTROPHILS # BLD: 5.1 K/UL (ref 1.7–7.7)
NEUTROPHILS NFR BLD: 77.8 %
PLATELET # BLD AUTO: 180 K/UL (ref 135–450)
PMV BLD AUTO: 8.2 FL (ref 5–10.5)
POTASSIUM SERPL-SCNC: 3.8 MMOL/L (ref 3.5–5.1)
PROCALCITONIN SERPL IA-MCNC: 0.69 NG/ML (ref 0–0.15)
PROT SERPL-MCNC: 5.1 G/DL (ref 6.4–8.2)
RBC # BLD AUTO: 3.79 M/UL (ref 4.2–5.9)
SODIUM SERPL-SCNC: 143 MMOL/L (ref 136–145)
WBC # BLD AUTO: 6.6 K/UL (ref 4–11)

## 2024-05-29 PROCEDURE — 88104 CYTOPATH FL NONGYN SMEARS: CPT

## 2024-05-29 PROCEDURE — 3609015100 HC ERCP STENT PLACEMENT BILIARY/PANCREATIC DUCT: Performed by: INTERNAL MEDICINE

## 2024-05-29 PROCEDURE — 2580000003 HC RX 258: Performed by: INTERNAL MEDICINE

## 2024-05-29 PROCEDURE — BF131ZZ FLUOROSCOPY OF GALLBLADDER AND BILE DUCTS USING LOW OSMOLAR CONTRAST: ICD-10-PCS | Performed by: INTERNAL MEDICINE

## 2024-05-29 PROCEDURE — 0F798DZ DILATION OF COMMON BILE DUCT WITH INTRALUMINAL DEVICE, VIA NATURAL OR ARTIFICIAL OPENING ENDOSCOPIC: ICD-10-PCS | Performed by: INTERNAL MEDICINE

## 2024-05-29 PROCEDURE — 88305 TISSUE EXAM BY PATHOLOGIST: CPT

## 2024-05-29 PROCEDURE — 36415 COLL VENOUS BLD VENIPUNCTURE: CPT

## 2024-05-29 PROCEDURE — 6360000004 HC RX CONTRAST MEDICATION: Performed by: INTERNAL MEDICINE

## 2024-05-29 PROCEDURE — 80076 HEPATIC FUNCTION PANEL: CPT

## 2024-05-29 PROCEDURE — 6370000000 HC RX 637 (ALT 250 FOR IP): Performed by: INTERNAL MEDICINE

## 2024-05-29 PROCEDURE — 83605 ASSAY OF LACTIC ACID: CPT

## 2024-05-29 PROCEDURE — 88342 IMHCHEM/IMCYTCHM 1ST ANTB: CPT

## 2024-05-29 PROCEDURE — 80048 BASIC METABOLIC PNL TOTAL CA: CPT

## 2024-05-29 PROCEDURE — 6360000002 HC RX W HCPCS: Performed by: INTERNAL MEDICINE

## 2024-05-29 PROCEDURE — 7100000000 HC PACU RECOVERY - FIRST 15 MIN: Performed by: INTERNAL MEDICINE

## 2024-05-29 PROCEDURE — 0FB98ZX EXCISION OF COMMON BILE DUCT, VIA NATURAL OR ARTIFICIAL OPENING ENDOSCOPIC, DIAGNOSTIC: ICD-10-PCS | Performed by: INTERNAL MEDICINE

## 2024-05-29 PROCEDURE — 74328 X-RAY BILE DUCT ENDOSCOPY: CPT

## 2024-05-29 PROCEDURE — 2709999900 HC NON-CHARGEABLE SUPPLY: Performed by: INTERNAL MEDICINE

## 2024-05-29 PROCEDURE — 86140 C-REACTIVE PROTEIN: CPT

## 2024-05-29 PROCEDURE — 1200000000 HC SEMI PRIVATE

## 2024-05-29 PROCEDURE — 3700000000 HC ANESTHESIA ATTENDED CARE: Performed by: INTERNAL MEDICINE

## 2024-05-29 PROCEDURE — 93010 ELECTROCARDIOGRAM REPORT: CPT | Performed by: INTERNAL MEDICINE

## 2024-05-29 PROCEDURE — 84145 PROCALCITONIN (PCT): CPT

## 2024-05-29 PROCEDURE — 3700000001 HC ADD 15 MINUTES (ANESTHESIA): Performed by: INTERNAL MEDICINE

## 2024-05-29 PROCEDURE — 2500000003 HC RX 250 WO HCPCS: Performed by: NURSE ANESTHETIST, CERTIFIED REGISTERED

## 2024-05-29 PROCEDURE — 71046 X-RAY EXAM CHEST 2 VIEWS: CPT

## 2024-05-29 PROCEDURE — 74183 MRI ABD W/O CNTR FLWD CNTR: CPT

## 2024-05-29 PROCEDURE — 3609012400 HC EGD TRANSORAL BIOPSY SINGLE/MULTIPLE: Performed by: INTERNAL MEDICINE

## 2024-05-29 PROCEDURE — 85025 COMPLETE CBC W/AUTO DIFF WBC: CPT

## 2024-05-29 PROCEDURE — A9577 INJ MULTIHANCE: HCPCS | Performed by: INTERNAL MEDICINE

## 2024-05-29 PROCEDURE — 0FBC8ZX EXCISION OF AMPULLA OF VATER, VIA NATURAL OR ARTIFICIAL OPENING ENDOSCOPIC, DIAGNOSTIC: ICD-10-PCS | Performed by: INTERNAL MEDICINE

## 2024-05-29 PROCEDURE — 6360000002 HC RX W HCPCS: Performed by: NURSE ANESTHETIST, CERTIFIED REGISTERED

## 2024-05-29 PROCEDURE — 0FD98ZX EXTRACTION OF COMMON BILE DUCT, VIA NATURAL OR ARTIFICIAL OPENING ENDOSCOPIC, DIAGNOSTIC: ICD-10-PCS | Performed by: INTERNAL MEDICINE

## 2024-05-29 PROCEDURE — 3609018800 HC ERCP DX COLLECTION SPECIMEN BRUSHING/WASHING: Performed by: INTERNAL MEDICINE

## 2024-05-29 PROCEDURE — 88341 IMHCHEM/IMCYTCHM EA ADD ANTB: CPT

## 2024-05-29 PROCEDURE — 7100000001 HC PACU RECOVERY - ADDTL 15 MIN: Performed by: INTERNAL MEDICINE

## 2024-05-29 PROCEDURE — C1874 STENT, COATED/COV W/DEL SYS: HCPCS | Performed by: INTERNAL MEDICINE

## 2024-05-29 PROCEDURE — C1769 GUIDE WIRE: HCPCS | Performed by: INTERNAL MEDICINE

## 2024-05-29 PROCEDURE — 87449 NOS EACH ORGANISM AG IA: CPT

## 2024-05-29 DEVICE — STENT SYSTEM RMV
Type: IMPLANTABLE DEVICE | Status: FUNCTIONAL
Brand: WALLFLEX BILIARY

## 2024-05-29 RX ORDER — ONDANSETRON 2 MG/ML
INJECTION INTRAMUSCULAR; INTRAVENOUS PRN
Status: DISCONTINUED | OUTPATIENT
Start: 2024-05-29 | End: 2024-05-29 | Stop reason: SDUPTHER

## 2024-05-29 RX ORDER — IPRATROPIUM BROMIDE AND ALBUTEROL SULFATE 2.5; .5 MG/3ML; MG/3ML
1 SOLUTION RESPIRATORY (INHALATION) EVERY 4 HOURS PRN
Status: DISCONTINUED | OUTPATIENT
Start: 2024-05-29 | End: 2024-05-31 | Stop reason: HOSPADM

## 2024-05-29 RX ORDER — SODIUM CHLORIDE 9 MG/ML
INJECTION, SOLUTION INTRAVENOUS CONTINUOUS
Status: DISCONTINUED | OUTPATIENT
Start: 2024-05-29 | End: 2024-05-29 | Stop reason: HOSPADM

## 2024-05-29 RX ORDER — INDOMETHACIN 50 MG/1
SUPPOSITORY RECTAL PRN
Status: DISCONTINUED | OUTPATIENT
Start: 2024-05-29 | End: 2024-05-29 | Stop reason: ALTCHOICE

## 2024-05-29 RX ORDER — ENOXAPARIN SODIUM 100 MG/ML
40 INJECTION SUBCUTANEOUS DAILY
Status: DISCONTINUED | OUTPATIENT
Start: 2024-05-30 | End: 2024-05-31 | Stop reason: HOSPADM

## 2024-05-29 RX ORDER — EPHEDRINE SULFATE 50 MG/ML
INJECTION INTRAVENOUS PRN
Status: DISCONTINUED | OUTPATIENT
Start: 2024-05-29 | End: 2024-05-29 | Stop reason: SDUPTHER

## 2024-05-29 RX ORDER — PROPOFOL 10 MG/ML
INJECTION, EMULSION INTRAVENOUS PRN
Status: DISCONTINUED | OUTPATIENT
Start: 2024-05-29 | End: 2024-05-29 | Stop reason: SDUPTHER

## 2024-05-29 RX ORDER — FENTANYL CITRATE 50 UG/ML
INJECTION, SOLUTION INTRAMUSCULAR; INTRAVENOUS PRN
Status: DISCONTINUED | OUTPATIENT
Start: 2024-05-29 | End: 2024-05-29 | Stop reason: SDUPTHER

## 2024-05-29 RX ORDER — ROCURONIUM BROMIDE 10 MG/ML
INJECTION, SOLUTION INTRAVENOUS PRN
Status: DISCONTINUED | OUTPATIENT
Start: 2024-05-29 | End: 2024-05-29 | Stop reason: SDUPTHER

## 2024-05-29 RX ORDER — LIDOCAINE HYDROCHLORIDE 20 MG/ML
INJECTION, SOLUTION INFILTRATION; PERINEURAL PRN
Status: DISCONTINUED | OUTPATIENT
Start: 2024-05-29 | End: 2024-05-29 | Stop reason: SDUPTHER

## 2024-05-29 RX ADMIN — ONDANSETRON 4 MG: 2 INJECTION INTRAMUSCULAR; INTRAVENOUS at 13:18

## 2024-05-29 RX ADMIN — PIPERACILLIN AND TAZOBACTAM 3375 MG: 3; .375 INJECTION, POWDER, LYOPHILIZED, FOR SOLUTION INTRAVENOUS at 14:46

## 2024-05-29 RX ADMIN — GADOBENATE DIMEGLUMINE 11 ML: 529 INJECTION, SOLUTION INTRAVENOUS at 09:45

## 2024-05-29 RX ADMIN — LIDOCAINE HYDROCHLORIDE 100 MG: 20 INJECTION, SOLUTION INFILTRATION; PERINEURAL at 12:57

## 2024-05-29 RX ADMIN — PROPOFOL 30 MG: 10 INJECTION, EMULSION INTRAVENOUS at 13:15

## 2024-05-29 RX ADMIN — SODIUM CHLORIDE: 9 INJECTION, SOLUTION INTRAVENOUS at 11:57

## 2024-05-29 RX ADMIN — PIPERACILLIN AND TAZOBACTAM 3375 MG: 3; .375 INJECTION, POWDER, LYOPHILIZED, FOR SOLUTION INTRAVENOUS at 20:03

## 2024-05-29 RX ADMIN — SUGAMMADEX 200 MG: 100 INJECTION, SOLUTION INTRAVENOUS at 13:36

## 2024-05-29 RX ADMIN — EPHEDRINE SULFATE 5 MG: 50 INJECTION, SOLUTION INTRAVENOUS at 13:27

## 2024-05-29 RX ADMIN — FENTANYL CITRATE 25 MCG: 50 INJECTION, SOLUTION INTRAMUSCULAR; INTRAVENOUS at 12:55

## 2024-05-29 RX ADMIN — SODIUM CHLORIDE: 9 INJECTION, SOLUTION INTRAVENOUS at 14:45

## 2024-05-29 RX ADMIN — PROPOFOL 80 MG: 10 INJECTION, EMULSION INTRAVENOUS at 12:57

## 2024-05-29 RX ADMIN — PIPERACILLIN AND TAZOBACTAM 3375 MG: 3; .375 INJECTION, POWDER, LYOPHILIZED, FOR SOLUTION INTRAVENOUS at 04:01

## 2024-05-29 RX ADMIN — FENTANYL CITRATE 25 MCG: 50 INJECTION, SOLUTION INTRAMUSCULAR; INTRAVENOUS at 13:15

## 2024-05-29 RX ADMIN — ROCURONIUM BROMIDE 40 MG: 10 INJECTION, SOLUTION INTRAVENOUS at 12:57

## 2024-05-29 RX ADMIN — SODIUM CHLORIDE, PRESERVATIVE FREE 10 ML: 5 INJECTION INTRAVENOUS at 14:30

## 2024-05-29 RX ADMIN — FENTANYL CITRATE 25 MCG: 50 INJECTION, SOLUTION INTRAMUSCULAR; INTRAVENOUS at 13:21

## 2024-05-29 RX ADMIN — SODIUM CHLORIDE, PRESERVATIVE FREE 10 ML: 5 INJECTION INTRAVENOUS at 20:05

## 2024-05-29 ASSESSMENT — ENCOUNTER SYMPTOMS: SHORTNESS OF BREATH: 0

## 2024-05-29 NOTE — ANESTHESIA POSTPROCEDURE EVALUATION
Department of Anesthesiology  Postprocedure Note    Patient: Severino Brown  MRN: [id number]  YOB: 1940  Date of evaluation: 5/29/2024    Procedure Summary       Date: 05/29/24 Room / Location: [address] [address] / [facility]    Anesthesia Start: 1251 Anesthesia Stop: 1351    Procedures:       ENDOSCOPIC RETROGRADE CHOLANGIOPANCREATOGRAPHY STENT INSERTION      ESOPHAGOGASTRODUODENOSCOPY BIOPSY (Abdomen)      ENDOSCOPIC RETROGRADE CHOLANGIOPANCREATOGRAPHY BILIARY BRUSHING Diagnosis:       Dilation of biliary tract      (Dilation of biliary tract [K83.8])    Surgeons: Jeffery Armenta MD Responsible Provider: Danyell Barker MD    Anesthesia Type: General ASA Status: 3            Anesthesia Type: General    Batool Phase I: Batool Score: 10    Batool Phase II:      Anesthesia Post Evaluation    Patient location during evaluation: bedside  Patient participation: complete - patient participated  Level of consciousness: awake and alert  Pain score: 1  Airway patency: patent  Nausea & Vomiting: no vomiting  Cardiovascular status: hemodynamically stable  Respiratory status: nonlabored ventilation  Hydration status: stable  Multimodal analgesia pain management approach  Pain management: adequate    No notable events documented.

## 2024-05-29 NOTE — BRIEF OP NOTE
Brief Postoperative Note - Full Note in Chart Review/Procedures tab       Patient: Severino Brown  YOB: 1940  MRN: [id number]    Date of Procedure: 5/29/2024    Pre-Op Diagnosis Codes:     * Dilation of biliary tract [K83.8]    Post-Op Diagnosis: Same       Procedure(s):  ENDOSCOPIC RETROGRADE CHOLANGIOPANCREATOGRAPHY STENT INSERTION  ESOPHAGOGASTRODUODENOSCOPY BIOPSY  ENDOSCOPIC RETROGRADE CHOLANGIOPANCREATOGRAPHY BILIARY BRUSHING    Surgeon(s):  Jeffery Armenta MD    Assistant:  * No surgical staff found *    Anesthesia: General    Estimated Blood Loss (mL): Minimal    Complications: None    Specimens:   ID Type Source Tests Collected by Time Destination   A : distal CBD stenosis brushing and brush tip Tissue Tissue SURGICAL PATHOLOGY Jeffery Armenta MD 5/29/2024 1324    B : ampullary mass bx Tissue Tissue SURGICAL PATHOLOGY Jeffery Armenta MD 5/29/2024 1325    C : distal CBD stenosis bx Tissue Tissue SURGICAL PATHOLOGY Jeffery Armenta MD 5/29/2024 1326        Implants:  Implant Name Type Inv. Item Serial No.  Lot No. LRB No. 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   Check biopsy results  F/u as inpatient  May need EUS for staging depending on path results    Electronically signed by Jeffery Armenta MD on 5/29/2024 at 1:49 PM

## 2024-05-29 NOTE — PROGRESS NOTES
Pt stable and able to be transferred from PACU to room 3328. A&O , VSS, with no complaints at this time with transporter.

## 2024-05-29 NOTE — PROGRESS NOTES
4 Eyes Skin Assessment     NAME:  Severino Brown  YOB: 1940  MEDICAL RECORD NUMBER:  [id number]    The patient is being assessed for  Admission    I agree that at least one RN has performed a thorough Head to Toe Skin Assessment on the patient. ALL assessment sites listed below have been assessed.      Areas assessed by both nurses:    Head, Face, Ears, Shoulders, Back, Chest, Arms, Elbows, Hands, Sacrum. Buttock, Coccyx, Ischium, Legs. Feet and Heels, and Under Medical Devices         Does the Patient have a Wound? Yes wound(s) were present on assessment. LDA wound assessment was Initiated and completed by RN       Tyrel Prevention initiated by RN: Yes  Wound Care Orders initiated by RN: No    Pressure Injury (Stage 3,4, Unstageable, DTI, NWPT, and Complex wounds) if present, place Wound referral order by RN under : No    New Ostomies, if present place, Ostomy referral order under : No     Nurse 1 eSignature: Electronically signed by Latisha Daniels RN on 5/29/24 at 4:30 AM EDT    **SHARE this note so that the co-signing nurse can place an eSignature**    Nurse 2 eSignature: Electronically signed by Maninder Ding RN on 5/30/24 at 11:17 AM EDT  2

## 2024-05-29 NOTE — RT PROTOCOL NOTE
RT Inhaler-Nebulizer Bronchodilator Protocol Note    There is a bronchodilator order in the chart from a provider indicating to follow the RT Bronchodilator Protocol and there is an “Initiate RT Inhaler-Nebulizer Bronchodilator Protocol” order as well (see protocol at bottom of note).    CXR Findings:  XR CHEST PORTABLE    Result Date: 5/28/2024  No radiographic evidence of acute pulmonary disease.       The findings from the last RT Protocol Assessment were as follows:   History Pulmonary Disease: None or smoker <15 pack years  Respiratory Pattern: Regular pattern and RR 12-20 bpm  Breath Sounds: Slightly diminished and/or crackles  Cough: Strong, spontaneous, non-productive  Indication for Bronchodilator Therapy: None  Bronchodilator Assessment Score: 2    Aerosolized bronchodilator medication orders have been revised according to the RT Inhaler-Nebulizer Bronchodilator Protocol below.    Respiratory Therapist to perform RT Therapy Protocol Assessment initially then follow the protocol.  Repeat RT Therapy Protocol Assessment PRN for score 0-3 or on second treatment, BID, and PRN for scores above 3.    No Indications - adjust the frequency to every 6 hours PRN wheezing or bronchospasm, if no treatments needed after 48 hours then discontinue using Per Protocol order mode.     If indication present, adjust the RT bronchodilator orders based on the Bronchodilator Assessment Score as indicated below.  Use Inhaler orders unless patient has one or more of the following: on home nebulizer, not able to hold breath for 10 seconds, is not alert and oriented, cannot activate and use MDI correctly, or respiratory rate 25 breaths per minute or more, then use the equivalent nebulizer order(s) with same Frequency and PRN reasons based on the score.  If a patient is on this medication at home then do not decrease Frequency below that used at home.    0-3 - enter or revise RT bronchodilator order(s) to equivalent RT Bronchodilator  order with Frequency of every 4 hours PRN for wheezing or increased work of breathing using Per Protocol order mode.        4-6 - enter or revise RT Bronchodilator order(s) to two equivalent RT bronchodilator orders with one order with BID Frequency and one order with Frequency of every 4 hours PRN wheezing or increased work of breathing using Per Protocol order mode.        7-10 - enter or revise RT Bronchodilator order(s) to two equivalent RT bronchodilator orders with one order with TID Frequency and one order with Frequency of every 4 hours PRN wheezing or increased work of breathing using Per Protocol order mode.       11-13 - enter or revise RT Bronchodilator order(s) to one equivalent RT bronchodilator order with QID Frequency and an Albuterol order with Frequency of every 4 hours PRN wheezing or increased work of breathing using Per Protocol order mode.      Greater than 13 - enter or revise RT Bronchodilator order(s) to one equivalent RT bronchodilator order with every 4 hours Frequency and an Albuterol order with Frequency of every 2 hours PRN wheezing or increased work of breathing using Per Protocol order mode.     RT to enter RT Home Evaluation for COPD & MDI Assessment order using Per Protocol order mode.    Electronically signed by MUSTAPHA BANKS RCP on 5/29/2024 at 4:06 AM

## 2024-05-29 NOTE — PROGRESS NOTES
Aspiration Screen    Name: Severino Brown  : 1940  Medical Diagnosis: Transaminitis [R74.01]  Generalized weakness [R53.1]  Frequent falls [R29.6]  Sepsis (HCC) [A41.9]    Swallow screen to rule out aspiration completed per pneumonia protocol. Patient demonstrates some high risk indicators for potential dysphagia / aspiration per swallow screen.  RECOMMEND: Clinical Swallow Evaluation at bedside to assess swallowing function, rule out aspiration, and determine appropriate diet level.     Kerrie Urena [facility]-SLP#[id number]

## 2024-05-29 NOTE — PROGRESS NOTES
Pt arrived from OR to PACU bay 7. Reported received from MINDA, RN/ CRNA staff. Pt is arousable to voice.  Pt on a simple mask @6-L with oral airway in place.  NSR, and VSS. Will continue to monitor.

## 2024-05-29 NOTE — PROGRESS NOTES
Pt is being transferred to room 3328. A&O with no signs of distress. Report given to Dorothea OLMSTEAD. V/u and denies questions or further needs at this time.

## 2024-05-29 NOTE — PROGRESS NOTES
05/29/24 0405   RT Protocol   History Pulmonary Disease 0   Respiratory pattern 0   Breath sounds 2   Cough 0   Indications for Bronchodilator Therapy None   Bronchodilator Assessment Score 2

## 2024-05-29 NOTE — PROGRESS NOTES
mEq, IntraVENous, PRN         Objective:  BP (!) 152/74   Pulse 67   Temp 98.3 °F (36.8 °C) (Oral)   Resp 16   Ht 1.829 m (6')   Wt 58.2 kg (128 lb 4.8 oz)   SpO2 98%   BMI 17.40 kg/m²      Patient Vitals for the past 24 hrs:   BP Temp Temp src Pulse Resp SpO2 Height Weight   05/29/24 0453 -- -- -- -- -- -- -- 58.2 kg (128 lb 4.8 oz)   05/28/24 2055 (!) 152/74 98.3 °F (36.8 °C) Oral 67 16 98 % 1.829 m (6') 57.6 kg (126 lb 14.4 oz)   05/28/24 1840 (!) 140/58 -- -- 72 19 100 % -- --   05/28/24 1830 138/83 -- -- 73 14 96 % -- --   05/28/24 1810 122/63 -- -- 71 15 98 % -- --   05/28/24 1800 126/75 -- -- 73 17 97 % -- --   05/28/24 1740 (!) 122/57 -- -- 67 14 97 % -- --   05/28/24 1730 (!) 117/54 -- -- 68 21 96 % -- --   05/28/24 1720 (!) 127/56 -- -- -- -- -- -- --   05/28/24 1700 (!) 123/54 -- -- 73 15 95 % -- --   05/28/24 1640 (!) 113/59 -- -- 74 16 92 % -- --   05/28/24 1630 (!) 107/52 -- -- 80 21 93 % -- --   05/28/24 1610 (!) 107/48 -- -- 79 25 94 % -- --   05/28/24 1600 (!) 107/49 -- -- 83 18 96 % -- --   05/28/24 1540 (!) 112/55 -- -- 88 23 95 % -- --   05/28/24 1530 (!) 114/53 -- -- 86 20 99 % -- --   05/28/24 1514 (!) 109/54 -- -- 85 18 100 % 1.829 m (6') 68 kg (150 lb)   05/28/24 1511 -- 99 °F (37.2 °C) Oral -- -- -- -- --     Patient Vitals for the past 96 hrs (Last 3 readings):   Weight   05/29/24 0453 58.2 kg (128 lb 4.8 oz)   05/28/24 2055 57.6 kg (126 lb 14.4 oz)   05/28/24 1514 68 kg (150 lb)         No intake or output data in the 24 hours ending 05/29/24 0825      Physical Exam: (2-7 system for EPF/Detailed, ?8 for Comprehensive)  BP (!) 152/74   Pulse 67   Temp 98.3 °F (36.8 °C) (Oral)   Resp 16   Ht 1.829 m (6')   Wt 58.2 kg (128 lb 4.8 oz)   SpO2 98%   BMI 17.40 kg/m²   Constitutional: vitals as above: alert, appears stated age and cooperative    Psychiatric: normal insight and judgment, oriented to person, place, time, and general circumstances    Head: Normocephalic, without obvious  no focal consolidations or pleural effusions. There is no appreciable pneumothorax. BONES/SOFT TISSUE: No acute abnormality.     No radiographic evidence of acute pulmonary disease.       Lab Results   Component Value Date/Time    GLUCOSE 141 05/29/2024 05:43 AM     Lab Results   Component Value Date/Time    POCGLU 113 01/14/2018 11:03 AM     BP (!) 152/74   Pulse 67   Temp 98.3 °F (36.8 °C) (Oral)   Resp 16   Ht 1.829 m (6')   Wt 58.2 kg (128 lb 4.8 oz)   SpO2 98%   BMI 17.40 kg/m²     Assessment and Plan:  Principal Problem:    Sepsis (HCC) -Established problem. Stable.    Plan: cont empiric iv abx  Active Problems:    Dementia (HCC)  Plan: pt/ot to see    Transaminitis  Plan: GI on board, repeat labs. MRCP today    Cholecystitis -Established problem. Stable.    Plan: cont empiric abx    Pneumonia -Established problem. Stable.    Plan: as above    UTI (urinary tract infection) -Established problem. Stable.  Ngtd on cx  Plan: cont abx. Trend wbc, procal.      Case discussed with: GI NP  Tests ordered/reviewed: cbc, bmp, lfts, MRCP          (Please note that portions of this note were completed with a voice recognition program.  Efforts were made to edit the dictations but occasionally words are mis-transcribed.)        Zachery Izaguirre MD  5/29/2024    Please use [employer] to contact me with any questions during the day.   The hospitalist service will provide cross-coverage for this patient from 7pm to 7am.    During those hours, contact the on-call hospitalist MD/YASMEEN for questions.

## 2024-05-29 NOTE — PROGRESS NOTES
Physical/Occupational Therapy  PT reviewed patient's chart. Patient currently MICA. Will re-attempt as the schedule allows.  Thank you.  Alma Gaspar PT, DPT, [id number]  Yumiko Duran, M/OT, OTR/L- [id number]

## 2024-05-29 NOTE — CONSULTS
Gastroenterology Consult Note        Patient: Severino Brown  : 1940  Acct#:      Date:  2024      1. Generalized weakness    2. Frequent falls    3. Transaminitis        Subjective:       History of Present Illness  Patient is a 84 y.o.  male admitted with Transaminitis [R74.01]  Generalized weakness [R53.1]  Frequent falls [R29.6]  Sepsis (HCC) [A41.9] who is seen in consult for anemia and jaundice and biliary dilation.  H/o CAD with prior stent, carotid stenosis, HTN, HLD. Has not seen a doctor since 2018.  Meds are listed below but he is not taking any meds at all including Plavix, herbals, antibiotics, over-the-counter meds.  He was brought to the ER for weakness and multiple falls over the past few weeks.  Also has had confusion.  In the ER he was diagnosed with possible UTI.  Noted to have elevated liver enzymes.  Imaging with biliary dilation.  His wife reports that he has had heartburn for his whole life.  In the past few months he has had decreased p.o. intake, early satiety, 30 pound unintentional weight loss.  No nausea or vomiting.  No melena or hematochezia.    Past Medical History:   Diagnosis Date    CAD (coronary artery disease)     Cancer (HCC)     testicle    Hyperlipidemia     Hypertension     SBO (small bowel obstruction) (HCC) 2017    d/t incarcerated right inguinal hernia      Past Surgical History:   Procedure Laterality Date    COLONOSCOPY      CORONARY ANGIOPLASTY WITH STENT PLACEMENT      FRACTURE SURGERY      HIP ARTHROPLASTY Left 2018    INGUINAL HERNIA REPAIR  2017    open incarcerated inguinal hernia repair with mesh     PROSTATECTOMY      pre cancerous    TESTICLE REMOVAL      TONSILLECTOMY        Past Endoscopic History: none in chart    Admission Meds  No current facility-administered medications on file prior to encounter.     Current Outpatient Medications on File Prior to Encounter   Medication Sig Dispense Refill     lisinopril (PRINIVIL;ZESTRIL) 20 MG tablet TAKE 1 TABLET BY MOUTH  DAILY (Patient not taking: Reported on 2024) 90 tablet 1    metoprolol tartrate (LOPRESSOR) 25 MG tablet Take 1 tablet by mouth 2 times daily (Patient not taking: Reported on 2024) 180 tablet 3    clopidogrel (PLAVIX) 75 MG tablet TAKE 1 TABLET BY MOUTH  DAILY (Patient not taking: Reported on 2024) 90 tablet 5    iron polysaccharides (NIFEREX) 150 MG capsule Take 1 capsule by mouth daily for 14 days (Patient not taking: Reported on 2024) 14 capsule 0    aspirin (ALFRED ASPIRIN) 325 MG tablet Take 1 tablet by mouth daily for 14 days (Patient not taking: Reported on 2024) 14 tablet 0    atorvastatin (LIPITOR) 20 MG tablet Take 1 tablet by mouth daily (Patient not taking: Reported on 2024) 30 tablet 3    furosemide (LASIX) 20 MG tablet Take 1 tablet by mouth daily (Patient not taking: Reported on 2024) 14 tablet 0    sennosides-docusate sodium (SENOKOT-S) 8.6-50 MG tablet Take 2 tablets by mouth daily (Patient not taking: Reported on 2024) 60 tablet 0    ranitidine (ZANTAC) 150 MG tablet Take 150 mg by mouth 2 times daily.   (Patient not taking: Reported on 2024)           Allergies  No Known Allergies   Social   Social History     Tobacco Use    Smoking status: Former     Current packs/day: 0.00     Types: Cigarettes     Quit date: 1978     Years since quittin.5    Smokeless tobacco: Never   Substance Use Topics    Alcohol use: No     Alcohol/week: 0.0 standard drinks of alcohol        Family History   Problem Relation Age of Onset    Heart Disease Mother     Heart Disease Father                   Physical Exam  Blood pressure (!) 152/74, pulse 67, temperature 98.3 °F (36.8 °C), temperature source Oral, resp. rate 16, height 1.829 m (6'), weight 58.2 kg (128 lb 4.8 oz), SpO2 98 %.    General appearance: Temporal wasting alert, pale, cooperative, no distress, appears stated age  Eyes:

## 2024-05-29 NOTE — ANESTHESIA PRE PROCEDURE
and personnel involved discussed with patient.  Patient verbalized an understanding and agrees to proceed.   )  Induction: intravenous.    MIPS: Postoperative opioids intended and Prophylactic antiemetics administered.  Anesthetic plan and risks discussed with patient.      Plan discussed with CRNA.                    Danyell Barker MD   5/29/2024

## 2024-05-30 PROBLEM — K86.89 PANCREATIC MASS: Status: ACTIVE | Noted: 2024-05-30

## 2024-05-30 LAB
ALBUMIN SERPL-MCNC: 2.5 G/DL (ref 3.4–5)
ALP SERPL-CCNC: 378 U/L (ref 40–129)
ALT SERPL-CCNC: 55 U/L (ref 10–40)
ANION GAP SERPL CALCULATED.3IONS-SCNC: 6 MMOL/L (ref 3–16)
AST SERPL-CCNC: 37 U/L (ref 15–37)
BASOPHILS # BLD: 0 K/UL (ref 0–0.2)
BASOPHILS NFR BLD: 0.2 %
BILIRUB DIRECT SERPL-MCNC: 1.6 MG/DL (ref 0–0.3)
BILIRUB INDIRECT SERPL-MCNC: 0.8 MG/DL (ref 0–1)
BILIRUB SERPL-MCNC: 2.4 MG/DL (ref 0–1)
BUN SERPL-MCNC: 10 MG/DL (ref 7–20)
CALCIUM SERPL-MCNC: 7.9 MG/DL (ref 8.3–10.6)
CHLORIDE SERPL-SCNC: 107 MMOL/L (ref 99–110)
CO2 SERPL-SCNC: 27 MMOL/L (ref 21–32)
CREAT SERPL-MCNC: 0.6 MG/DL (ref 0.8–1.3)
DEPRECATED RDW RBC AUTO: 17.3 % (ref 12.4–15.4)
EOSINOPHIL # BLD: 0 K/UL (ref 0–0.6)
EOSINOPHIL NFR BLD: 0.5 %
GFR SERPLBLD CREATININE-BSD FMLA CKD-EPI: >90 ML/MIN/{1.73_M2}
GLUCOSE SERPL-MCNC: 100 MG/DL (ref 70–99)
HCT VFR BLD AUTO: 34.3 % (ref 40.5–52.5)
HGB BLD-MCNC: 11.4 G/DL (ref 13.5–17.5)
LEGIONELLA AG UR QL: NORMAL
LYMPHOCYTES # BLD: 0.8 K/UL (ref 1–5.1)
LYMPHOCYTES NFR BLD: 14.4 %
MCH RBC QN AUTO: 30.3 PG (ref 26–34)
MCHC RBC AUTO-ENTMCNC: 33.2 G/DL (ref 31–36)
MCV RBC AUTO: 91.2 FL (ref 80–100)
MONOCYTES # BLD: 0.4 K/UL (ref 0–1.3)
MONOCYTES NFR BLD: 7.8 %
NEUTROPHILS # BLD: 4.3 K/UL (ref 1.7–7.7)
NEUTROPHILS NFR BLD: 77.1 %
PLATELET # BLD AUTO: 187 K/UL (ref 135–450)
PMV BLD AUTO: 8.6 FL (ref 5–10.5)
POTASSIUM SERPL-SCNC: 3.2 MMOL/L (ref 3.5–5.1)
PROCALCITONIN SERPL IA-MCNC: 0.33 NG/ML (ref 0–0.15)
PROT SERPL-MCNC: 4.8 G/DL (ref 6.4–8.2)
RBC # BLD AUTO: 3.77 M/UL (ref 4.2–5.9)
SODIUM SERPL-SCNC: 140 MMOL/L (ref 136–145)
WBC # BLD AUTO: 5.6 K/UL (ref 4–11)

## 2024-05-30 PROCEDURE — 85025 COMPLETE CBC W/AUTO DIFF WBC: CPT

## 2024-05-30 PROCEDURE — 2580000003 HC RX 258: Performed by: INTERNAL MEDICINE

## 2024-05-30 PROCEDURE — 97116 GAIT TRAINING THERAPY: CPT

## 2024-05-30 PROCEDURE — 80048 BASIC METABOLIC PNL TOTAL CA: CPT

## 2024-05-30 PROCEDURE — 84145 PROCALCITONIN (PCT): CPT

## 2024-05-30 PROCEDURE — 97530 THERAPEUTIC ACTIVITIES: CPT

## 2024-05-30 PROCEDURE — 1200000000 HC SEMI PRIVATE

## 2024-05-30 PROCEDURE — 6360000002 HC RX W HCPCS: Performed by: INTERNAL MEDICINE

## 2024-05-30 PROCEDURE — 36415 COLL VENOUS BLD VENIPUNCTURE: CPT

## 2024-05-30 PROCEDURE — 97535 SELF CARE MNGMENT TRAINING: CPT

## 2024-05-30 PROCEDURE — 97161 PT EVAL LOW COMPLEX 20 MIN: CPT

## 2024-05-30 PROCEDURE — 6370000000 HC RX 637 (ALT 250 FOR IP): Performed by: INTERNAL MEDICINE

## 2024-05-30 PROCEDURE — 97165 OT EVAL LOW COMPLEX 30 MIN: CPT

## 2024-05-30 PROCEDURE — 80076 HEPATIC FUNCTION PANEL: CPT

## 2024-05-30 RX ADMIN — PIPERACILLIN AND TAZOBACTAM 3375 MG: 3; .375 INJECTION, POWDER, LYOPHILIZED, FOR SOLUTION INTRAVENOUS at 20:54

## 2024-05-30 RX ADMIN — PIPERACILLIN AND TAZOBACTAM 3375 MG: 3; .375 INJECTION, POWDER, LYOPHILIZED, FOR SOLUTION INTRAVENOUS at 04:07

## 2024-05-30 RX ADMIN — POTASSIUM CHLORIDE 40 MEQ: 1500 TABLET, EXTENDED RELEASE ORAL at 21:47

## 2024-05-30 RX ADMIN — SODIUM CHLORIDE, PRESERVATIVE FREE 10 ML: 5 INJECTION INTRAVENOUS at 09:12

## 2024-05-30 RX ADMIN — PIPERACILLIN AND TAZOBACTAM 3375 MG: 3; .375 INJECTION, POWDER, LYOPHILIZED, FOR SOLUTION INTRAVENOUS at 12:56

## 2024-05-30 RX ADMIN — ENOXAPARIN SODIUM 40 MG: 100 INJECTION SUBCUTANEOUS at 09:11

## 2024-05-30 ASSESSMENT — PAIN SCALES - GENERAL: PAINLEVEL_OUTOF10: 0

## 2024-05-30 NOTE — PROGRESS NOTES
[facility]  Call to wife's cell. No answer. Will stop by room today to see if she is present.     Lia Simmons RN   (work cell)  [phone] (main & referrals)

## 2024-05-30 NOTE — PROGRESS NOTES
HOSPICE [facility]  Met with patient and patient's daughter at bedside. Discussed hospice philosophy and services. Patient's wife coming in at 1530 today to sign hospice consents and for me to answer any remaining questions. Family to move furniture this evening, plan for equipment to be delivered tomorrow in the morning/afternoon. Will discharge home with hospice when MD feels patient is ready for discharge.     Lia Simmons RN    (work cell)  [phone] (main & referrals)

## 2024-05-30 NOTE — PROGRESS NOTES
numbness and tingling in (B) LE  Proprioception:    WFL  Tone:   Hypotonic in (B) UE  Coordination Testing:   WFL    ROM:   (B) UE AROM WFL  Strength:   (B) UE strength grossly +3    Therapist Clinical Decision Making (Complexity): low complexity  Clinical Presentation: stable      Subjective  General: Pt supine in bed upon entry with spouse and daughter present, agreeable to OT/PT evaluation.   Pain: 0/10  Pain Interventions: not applicable        Activities of Daily Living  Basic Activities of Daily Living  Lower Extremity Dressing: minimal assistance  Dressing Comments: pt demo ability to thread B LEs into pants, assist to perform LB clothing management over hips once in stance, pt demo ability to doff/kasey socks  Comments: Pt declined further ADL completion.   Instrumental Activities of Daily Living  No IADL completed on this date.    Functional Mobility  Bed Mobility:  Supine to Sit: stand by assistance  Scooting: stand by assistance  Comments:  Transfers:  Sit to stand transfer:contact guard assistance  Stand to sit transfer: contact guard assistance  Comments: progressing to SBA with repetition    5x STS assessment performed at CGA- 22 seconds to perform to assess balance/fall risk.     14.8 seconds is average for individuals 80-89 y.o   Functional Mobility  Functional Mobility Activity: long bout of household distance ambulation in hallway   Device Use: rolling walker  Required Assistance: contact guard assistance  Comment: downward head/gaze, min verbal cues for safe RW proximity to RW during mobility, use of rw for UE support. Slow xochitl  Balance:  Static Sitting Balance: fair (+): maintains balance at SBA/supervision without use of UE support  Dynamic Sitting Balance: fair: maintains balance at CGA without use of UE support  Static Standing Balance: fair (-): maintains balance at CGA with use of UE support  Dynamic Standing Balance: fair (-): maintains balance at CGA with use of UE support  Comments:  to above deficits, associated with Pancreatic mass. Typically, pt has required increased assistance from spouse at home, required set up/increased time to perform dressing/bathing tasks as well as requiring physical assistance for fxl mobility/transfers with quad cane. Family reports a decline in function especially over the past three weeks. Currently, pt is requiring SBA/CGA for fxl transfers/mobility and limited by decreased balance, endurance, strength, and cognition. Continued OT indicated in order to promote return to PLOF   Safety Interventions: patient left in chair, chair alarm in place, call light within reach, gait belt, patient at risk for falls, nurse notified, and family/caregiver present    Plan  Frequency: 3-5 x/per week  Current Treatment Recommendations: strengthening, balance training, functional mobility training, transfer training, endurance training, patient/caregiver education, ADL/self-care training, cognitive/perceptual training, cognitive reorientation, home exercise program, safety education, equipment evaluation/education, and positioning    Goals  Patient Goals: to go home   Short Term Goals:  Time Frame: discharge  Patient will complete lower body ADL at modified independent   Patient will complete toileting at modified independent   Patient will complete functional transfers at modified independent   Patient will complete functional mobility at modified independent   Patient to maintain standing at modified independent for 15 minutes.  Patient to gather and transport IADL items at modified independent     Above goals reviewed on 5/30/2024.  All goals are ongoing at this time unless indicated above.       Therapy Session Time     Individual Group Co-treatment   Time In    1118   Time Out    1211   Minutes    53      Timed Code Treatment Minutes:   38 Minutes  Total Treatment Minutes:  53 Minutes     Electronically Signed By: ALISSA Serna/OT, OTR/L- [id number]

## 2024-05-30 NOTE — PROGRESS NOTES
Progress Note - Dr. Izaguirre - Internal Medicine  PCP: No primary care provider on file. No primary physician on file. None    Hospital Day: 2  Code Status: Full Code  Current Diet: ADULT DIET; Clear Liquid        CC: follow up on medical issues    Subjective:   Severino Brown is a 84 y.o. male.    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   Positive for weight loss, decreased appetite  HENT: Negative for dental problem, nosebleeds and rhinorrhea.      Eyes: Negative for photophobia and visual disturbance.     Respiratory: Negative for cough, chest tightness and shortness of breath.      Cardiovascular: Negative for chest pain and leg swelling.     Gastrointestinal: Negative for diarrhea, nausea and vomiting.     Endocrine: Negative for polydipsia and polyphagia.     Genitourinary: Negative for frequency, hematuria and urgency.     Musculoskeletal: Negative for back pain and myalgias.     Skin: Negative for rash.     Allergic/Immunologic: Negative for food allergies.     Neurological: Negative for dizziness, seizures, syncope and facial asymmetry.   Positive for falls  Hematological: Negative for adenopathy.     Psychiatric/Behavioral: Negative for dysphoric mood. The patient is not nervous/anxious.           I have reviewed the patient's medical and social history in detail and updated the computerized patient record.  To recap: He  has a past medical history of CAD (coronary artery disease), Cancer (HCC), Hyperlipidemia, Hypertension, and SBO (small bowel obstruction) ([facility]).. He  has a past surgical history that includes Coronary angioplasty with stent; Testicle removal; Prostatectomy; Colonoscopy; Tonsillectomy; fracture surgery;    Component Value Date/Time    POCGLU 113 01/14/2018 11:03 AM     BP (!) 175/76   Pulse 55   Temp 97.5 °F (36.4 °C) (Oral)   Resp 16   Ht 1.829 m (6')   Wt 56 kg (123 lb 6.4 oz)   SpO2 99%   BMI 16.74 kg/m²     Assessment and Plan:  Principal Problem:    Sepsis (HCC) -Established problem. Stable.    Plan: cont empiric iv abx  Active Problems:    Dementia (HCC)  Plan: pt/ot to see    Transaminitis  Plan: GI on board, repeat labs. MRCP shows ampullary mass    Cholecystitis -Established problem. Stable.    Plan: cont empiric abx    Pneumonia -Established problem. Stable.    Plan: as above    UTI (urinary tract infection) -Established problem. Stable.  Ngtd on cx  Plan: cont abx. Trend wbc, procal.      Case discussed with: GI   Tests ordered/reviewed: cbc, bmp, lfts, MRCP          (Please note that portions of this note were completed with a voice recognition program.  Efforts were made to edit the dictations but occasionally words are mis-transcribed.)        Zachery Izaguirre MD  5/30/2024    Please use [employer] to contact me with any questions during the day.   The hospitalist service will provide cross-coverage for this patient from 7pm to 7am.    During those hours, contact the on-call hospitalist MD/YASMEEN for questions.

## 2024-05-30 NOTE — PROGRESS NOTES
[facility] - Inpatient Rehabilitation Department   Phone: [phone]    Physical Therapy    [x] Initial Evaluation            [] Daily Treatment Note         [] Discharge Summary      Patient: Severino Brown   : 1940   MRN: [id number]   Date of Service:  2024  Admitting Diagnosis: Pancreatic mass  Current Admission Summary: Per H&P 24 \"Severino Brown is a 84 y.o. male who presents via EMS for complaints of weakness. Patient presents with wife for complaints of weakness and falls for the last couple months. Patient has not seen a doctor since 2018 when he fell and sustained a left hip fracture.  His doctor then retired, and the patient stopped seeing his primary. The patient stopped all of his home medications, refusing to leave the house. His wife states that he began to become altered, with increasing weakness at home. Patient has had multiple falls in the last few months, 1 to 2 weeks ago, and 1 today where his wife lowered him to the floor. She states that he has a history of cardiac surgeries, hypertension.  States he does have a history of prostate cancer. States last 2 weeks he has become incontinent of urine.  Wife states that he has also had recent weight loss. Patient used to really like eating food, now will only eat a few bites of each meal and states that he is full.  She said recently has been very confused, states that he was trying to drink a glass of water recently and could not remember how to lift a glass to his mouth. Frequently asks for help on things he used to know how to do. Concern for inability to perform ADLs. No other complaints or concerns at this time\"      Past Medical History:  has a past medical history of CAD (coronary artery disease), Cancer ([facility]), Hyperlipidemia, Hypertension, and SBO (small bowel obstruction) ([facility]).  Past Surgical History:  has a past surgical history that includes Coronary angioplasty with stent; Testicle removal;  (Complexity): low complexity  Clinical Presentation: stable      Subjective  General: Pt semi-reclined in bed upon therapist arrival. Pt agreeable to PT/OT eval although was apprehensive about going on the stairs. Pt's wife and family members (3) were in the room at the beginning and the end of the session. Pt reports that vision was blurry - started this morning.  Pain: 0/10  Pain Interventions: not applicable       Functional Mobility  Bed Mobility:  Supine to Sit: stand by assistance  Scooting: Independent, stand by assistance  Comments: HOB elevated.  Transfers:  Sit to stand transfer: CGA progressing to SBA  Stand to sit transfer: CGA progressing to SBA  Comments: From/to EOB and recliner. Pt needed CGA at first but after some activity, progressed to SBA.   Ambulation:  Surface:level surface  Assistive Device: rolling walker  Assistance: contact guard assistance  Distance: 100'  Gait Mechanics: Decreased gait speed, forward flexed posture, intermittently locking knees, increased reliance on UE for support  Comments:    Stair Mobility:  Stair mobility not completed on this date.  Comments:  Wheelchair Mobility:  No w/c mobility completed on this date.  Comments:  Balance:  Static Sitting Balance: good: independent with functional balance in unsupported position  Dynamic Sitting Balance: fair (+): maintains balance at SBA/supervision without use of UE support  Static Standing Balance: fair: maintains balance at CGA without use of UE support  Dynamic Standing Balance: fair (-): maintains balance at CGA with use of UE support  Comments:    Other Therapeutic Interventions  Assisted pt w/ dressing.     5x sit-to-stand test: 22 seconds   - pt used (B) UE to push off of armchairs each trial    Reaching up for pen in various directions x5  - pt needed CGA    Picking up pen from floor x1  - pt needed CGA and unilateral UE support    Toe taps w/ (B) UE support on RW x5   - CGA   - Increased reliance on UE    Toe taps w/

## 2024-05-30 NOTE — CONSULTS
PALLIATIVE MEDICINE CONSULTATION     Patient name:Severino Brown   MRN:[id number]    :1940  Room/Bed:[facility]-3328/3328-01   LOS: 2 days         Date of consult:2024    Inpatient consult to Palliative Care  Consult performed by: Sharon Pettit APRN - CNP  Consult ordered by: Zachery Izaguirre MD  Reason for consult: GOC and code status              ASSESSMENT/RECOMMENDATIONS     84 y.o. male with pancreatic mass, severe protein calorie malnutrition and AMS      Symptom Management:  Pancreatic mass- believed to be cancer after ERCP awaiting biopsy result   Malnutrition-  Albumin 2.5 and Total Protein 4.8 with 30lb weight loss in 6 months, severe muscle wasting, disoriented and forgetful   Goals of Care- talked to pt and spouse about goals of care. Pt states that he hates doctor and wants to go home and never come back he became upset and needed reminding on his situation and location. Talked to the wife outside of the room about her plans. She states that they have lost several family members to cancer including pts first wife that he would not want to go through work-up and Tx and she feels that he is too weak to withstand any additional testing or Tx. She is asking if taking him home with Hospice support would be an option. She asked to talk to [facility] that's who they had for other family members in the past. Referral faxed to [facility] and code updated to [facility] with the goal being comfort care only.     Patient/Family Goals of Care :    talked to pt and spouse about goals of care. Pt states that he hates doctor and wants to go home and never come back he became upset and needed reminding on his situation and location. Talked to the wife outside of the room about her plans. She states that they have lost several family members to cancer including pts first wife that he would not want to go through work-up and Tx and she feels that he is too weak to withstand any additional testing or Tx. She is

## 2024-05-30 NOTE — PROGRESS NOTES
portal veins are  appropriately opacified.     GALLBLADDER/BILE DUCTS: Distended gallbladder.  No stones or wall thickening  identified.  Biliary dilatation is again demonstrated.  The proximal  extrahepatic duct measures 19 mm with tapering to 9 mm at the ampulla.  No  filling defect identified     PANCREAS: Pancreatic duct enlargement measures 6-7 mm.  While there is no  clear definable mass on postcontrast imaging, increased diffusion signal  abnormality is present in the pancreatic head.  No surrounding inflammatory  change or fluid collection.     SPLEEN:  No significant findings.     ADRENAL GLANDS:  Normal.     KIDNEYS:  No significant findings.     GI/BOWEL: The visualized bowel is normal in caliber.  Diverticulosis.     PERITONEUM/RETROPERITONEUM:  No abdominal lymphadenopathy.  No free  intraperitoneal fluid.     VESSELS:  The aorta is normal in caliber. The visceral branches are patent.  The IVC is appropriately opacified.     SOFT TISSUES/BONES:  No suspicious signal abnormality identified.     *Unless otherwise specified, incidental findings do not require dedicated  imaging follow-up.     IMPRESSION:  1.  Biliary and pancreatic duct dilatation with findings consistent with  underlying mass or stricture.  Diffusion signal abnormality in the pancreatic  head is suspicious for underlying mass, although this is not well delineated  on postcontrast imaging.  Further evaluation with ERCP is recommended.  If  this cannot be performed, PET-CT should be considered.     2.  The SMA is patent without evidence for filling defect.      ASSESSMENT :    Obstructive jaundice due to malignant biliary obstruction - now s/p ERCP  5/29/2024 demonstrating Ampullary/duodenal mass extending into distal CBD that was biopsied and successful 10 mm width x 40 mm length fully-covered expandable metal mesh WallFlex Biliary stent placement.  He feels better this AM and is not jaundiced on exam.  I discussed that this duodenal mass

## 2024-05-31 VITALS
HEIGHT: 72 IN | RESPIRATION RATE: 18 BRPM | BODY MASS INDEX: 17.36 KG/M2 | HEART RATE: 52 BPM | DIASTOLIC BLOOD PRESSURE: 57 MMHG | OXYGEN SATURATION: 98 % | WEIGHT: 128.2 LBS | TEMPERATURE: 97.4 F | SYSTOLIC BLOOD PRESSURE: 165 MMHG

## 2024-05-31 LAB
ALBUMIN SERPL-MCNC: 2.5 G/DL (ref 3.4–5)
ALP SERPL-CCNC: 343 U/L (ref 40–129)
ALT SERPL-CCNC: 50 U/L (ref 10–40)
ANION GAP SERPL CALCULATED.3IONS-SCNC: 5 MMOL/L (ref 3–16)
AST SERPL-CCNC: 31 U/L (ref 15–37)
BASOPHILS # BLD: 0 K/UL (ref 0–0.2)
BASOPHILS NFR BLD: 0.2 %
BILIRUB DIRECT SERPL-MCNC: 1.2 MG/DL (ref 0–0.3)
BILIRUB INDIRECT SERPL-MCNC: 0.7 MG/DL (ref 0–1)
BILIRUB SERPL-MCNC: 1.9 MG/DL (ref 0–1)
BUN SERPL-MCNC: 11 MG/DL (ref 7–20)
CALCIUM SERPL-MCNC: 8 MG/DL (ref 8.3–10.6)
CHLORIDE SERPL-SCNC: 106 MMOL/L (ref 99–110)
CO2 SERPL-SCNC: 27 MMOL/L (ref 21–32)
CREAT SERPL-MCNC: 0.7 MG/DL (ref 0.8–1.3)
DEPRECATED RDW RBC AUTO: 17.7 % (ref 12.4–15.4)
EOSINOPHIL # BLD: 0.1 K/UL (ref 0–0.6)
EOSINOPHIL NFR BLD: 1.4 %
GFR SERPLBLD CREATININE-BSD FMLA CKD-EPI: >90 ML/MIN/{1.73_M2}
GLUCOSE SERPL-MCNC: 108 MG/DL (ref 70–99)
HCT VFR BLD AUTO: 35 % (ref 40.5–52.5)
HGB BLD-MCNC: 11.5 G/DL (ref 13.5–17.5)
LYMPHOCYTES # BLD: 1 K/UL (ref 1–5.1)
LYMPHOCYTES NFR BLD: 17.3 %
MCH RBC QN AUTO: 30.1 PG (ref 26–34)
MCHC RBC AUTO-ENTMCNC: 32.9 G/DL (ref 31–36)
MCV RBC AUTO: 91.4 FL (ref 80–100)
MONOCYTES # BLD: 0.5 K/UL (ref 0–1.3)
MONOCYTES NFR BLD: 8.3 %
NEUTROPHILS # BLD: 4 K/UL (ref 1.7–7.7)
NEUTROPHILS NFR BLD: 72.8 %
PLATELET # BLD AUTO: 178 K/UL (ref 135–450)
PMV BLD AUTO: 8.7 FL (ref 5–10.5)
POTASSIUM SERPL-SCNC: 3.2 MMOL/L (ref 3.5–5.1)
PROT SERPL-MCNC: 4.8 G/DL (ref 6.4–8.2)
RBC # BLD AUTO: 3.83 M/UL (ref 4.2–5.9)
SODIUM SERPL-SCNC: 138 MMOL/L (ref 136–145)
WBC # BLD AUTO: 5.5 K/UL (ref 4–11)

## 2024-05-31 PROCEDURE — 6370000000 HC RX 637 (ALT 250 FOR IP): Performed by: NURSE PRACTITIONER

## 2024-05-31 PROCEDURE — 2580000003 HC RX 258: Performed by: INTERNAL MEDICINE

## 2024-05-31 PROCEDURE — 80048 BASIC METABOLIC PNL TOTAL CA: CPT

## 2024-05-31 PROCEDURE — 6360000002 HC RX W HCPCS: Performed by: INTERNAL MEDICINE

## 2024-05-31 PROCEDURE — 36415 COLL VENOUS BLD VENIPUNCTURE: CPT

## 2024-05-31 PROCEDURE — 6370000000 HC RX 637 (ALT 250 FOR IP): Performed by: INTERNAL MEDICINE

## 2024-05-31 PROCEDURE — 85025 COMPLETE CBC W/AUTO DIFF WBC: CPT

## 2024-05-31 PROCEDURE — 80076 HEPATIC FUNCTION PANEL: CPT

## 2024-05-31 RX ORDER — AMOXICILLIN AND CLAVULANATE POTASSIUM 875; 125 MG/1; MG/1
1 TABLET, FILM COATED ORAL 2 TIMES DAILY
Qty: 14 TABLET | Refills: 0 | Status: SHIPPED | OUTPATIENT
Start: 2024-05-31 | End: 2024-06-07

## 2024-05-31 RX ORDER — CALCIUM CARBONATE 500 MG/1
500 TABLET, CHEWABLE ORAL 3 TIMES DAILY PRN
Status: DISCONTINUED | OUTPATIENT
Start: 2024-05-31 | End: 2024-05-31 | Stop reason: HOSPADM

## 2024-05-31 RX ORDER — LORAZEPAM 2 MG/ML
1 CONCENTRATE ORAL EVERY 8 HOURS PRN
Qty: 1 ML | Refills: 0 | Status: SHIPPED | OUTPATIENT
Start: 2024-05-31 | End: 2024-06-06

## 2024-05-31 RX ORDER — MORPHINE SULFATE 10 MG/5ML
5 SOLUTION ORAL
Qty: 1 EACH | Refills: 0 | Status: SHIPPED | OUTPATIENT
Start: 2024-05-31 | End: 2024-06-10

## 2024-05-31 RX ADMIN — PIPERACILLIN AND TAZOBACTAM 3375 MG: 3; .375 INJECTION, POWDER, LYOPHILIZED, FOR SOLUTION INTRAVENOUS at 05:05

## 2024-05-31 RX ADMIN — ANTACID TABLETS 500 MG: 500 TABLET, CHEWABLE ORAL at 00:45

## 2024-05-31 RX ADMIN — ENOXAPARIN SODIUM 40 MG: 100 INJECTION SUBCUTANEOUS at 08:16

## 2024-05-31 RX ADMIN — SODIUM CHLORIDE, PRESERVATIVE FREE 10 ML: 5 INJECTION INTRAVENOUS at 08:15

## 2024-05-31 RX ADMIN — POTASSIUM CHLORIDE 40 MEQ: 1500 TABLET, EXTENDED RELEASE ORAL at 08:16

## 2024-05-31 NOTE — CARE COORDINATION
Discharge Planning:     (CM) spoke with [facility] Carmen wen. Patient is being discharged today to in-home setting with [facility]. A hospital bed for patient is being delivered today. Patient's daughter transported patient home.     Electronically signed by CEASAR Floyd on 5/31/2024 at 11:44 AM

## 2024-05-31 NOTE — PLAN OF CARE
Problem: Safety - Adult  Goal: Free from fall injury  5/31/2024 1117 by Maninder Ding RN  Outcome: Adequate for Discharge  5/30/2024 2330 by Magdalena Sarmiento RN  Outcome: Progressing  Flowsheets (Taken 5/30/2024 2330)  Free From Fall Injury:   Instruct family/caregiver on patient safety   Based on caregiver fall risk screen, instruct family/caregiver to ask for assistance with transferring infant if caregiver noted to have fall risk factors     Problem: Discharge Planning  Goal: Discharge to home or other facility with appropriate resources  5/31/2024 1117 by Maninder Ding RN  Outcome: Adequate for Discharge  5/30/2024 2330 by Magdalena Sarmiento RN  Outcome: Progressing  Flowsheets (Taken 5/30/2024 2330)  Discharge to home or other facility with appropriate resources: Identify barriers to discharge with patient and caregiver     Problem: Skin/Tissue Integrity  Goal: Absence of new skin breakdown  Description: 1.  Monitor for areas of redness and/or skin breakdown  2.  Assess vascular access sites hourly  3.  Every 4-6 hours minimum:  Change oxygen saturation probe site  4.  Every 4-6 hours:  If on nasal continuous positive airway pressure, respiratory therapy assess nares and determine need for appliance change or resting period.  5/31/2024 1117 by Maninder Ding RN  Outcome: Adequate for Discharge  5/30/2024 2330 by Magdalena Sarmiento RN  Outcome: Progressing     Problem: ABCDS Injury Assessment  Goal: Absence of physical injury  5/31/2024 1117 by Maninder Ding RN  Outcome: Adequate for Discharge  5/30/2024 2330 by Magdalena Sarmiento RN  Outcome: Progressing  Flowsheets (Taken 5/30/2024 2330)  Absence of Physical Injury: Implement safety measures based on patient assessment

## 2024-05-31 NOTE — PROGRESS NOTES
[facility]    Met with patient and wife.  DME to be delivered this morning.  Family will transport patient home by private car this afternoon.  Discharge orders and comfort prescription in place.  Discussed with nurse who was instructed to make sure she goes over discharge teaching and AVS with patient/family and remove all Ivs.  She also was asked for a note for daughter who missed jury duty secondary to her father's medical issues.  CM Ana aware of discharge.  Thank you for this opportunity to SERVE this patient and family.

## 2024-05-31 NOTE — DISCHARGE SUMMARY
Hospice for homecare, which is reasonable \"      Pt and family agreeable to [facility] care at home    Consults made during Hospitalization:  IP CONSULT TO GI  IP CONSULT TO PALLIATIVE CARE  IP CONSULT TO HOSPICE    Treatment team at time of Discharge: Treatment Team: Attending Provider: Zachery Izaguirre MD; Consulting Physician: Zachery Izaguirre MD; Consulting Physician: Jeffery Armenta MD; Surgeon: eJffery Armenta MD; Nurse Practitioner: Sharon Pettit APRN - CNP; Consulting Physician: Sharon Pettit APRN - CNP; Registered Nurse: Natan Hutchins RN; Respiratory Therapist (Day): Christina Baker RCP; Utilization Reviewer: Alpa Mixon RN; Patient Care Tech: Kate Ramirez; Registered Nurse: Mikki Andino RN; Registered Nurse: Maninder Ding RN    Imaging Results:  MRI ABDOMEN W WO CONTRAST MRCP    Result Date: 5/30/2024  EXAMINATION: MRI OF THE ABDOMEN WITH AND WITHOUT CONTRAST AND MRCP 5/29/2024 8:58 am TECHNIQUE: Multiplanar multisequence MRI of the abdomen was performed with and without the administration of intravenous contrast.  After initial T2 axial and coronal images, thick slab, thin slab and 3D coronal MRCP sequences were obtained without the administration of intravenous contrast.  MIP images are provided for review.  3D reconstruction of the biliary tree was performed on a separate workstation. COMPARISON: Ultrasound and CT exams yesterday. HISTORY: ORDERING SYSTEM PROVIDED HISTORY: distended GB, transaminitis TECHNOLOGIST PROVIDED HISTORY: Reason for exam:->distended GB, transaminitis Decision Support Exception - unselect if not a suspected or confirmed emergency medical condition->Emergency Medical Condition (MA) Reason for Exam: 11ml multihance given. FINDINGS: Motion degraded exam. LOWER CHEST: Trace pleural effusions. LIVER: The liver is normal in morphology. No evidence for steatosis. No suspicious liver lesion identified. The hepatic veins and portal veins are  HDL 41 12/15/2016    ALT 50 (H) 05/31/2024    AST 31 05/31/2024     05/31/2024    K 3.2 (L) 05/31/2024     05/31/2024    CREATININE 0.7 (L) 05/31/2024    BUN 11 05/31/2024    CO2 27 05/31/2024    TSH 0.85 11/06/2015    PSA <0.01 11/06/2015    INR 1.02 01/10/2018     Lab Results   Component Value Date    TROPONINI <0.01 01/10/2018       (Please note that portions of this note were completed with a voice recognition program.  Efforts were made to edit the dictations but occasionally words are mis-transcribed.)      Signed:  Zachery Izaguirre MD  5/31/2024    Please use [employer] to contact me with any questions during the day.   The hospitalist service will provide cross-coverage for this patient from 7pm to 7am.    During those hours, contact the on-call hospitalist MD/YASMEEN for questions.

## 2024-05-31 NOTE — DISCHARGE INSTR - COC
Continuity of Care Form    Patient Name: Severino Brown   :  1940  MRN:  [id number]    Admit date:  2024  Discharge date:  ***    Code Status Order: DNR-CC   Advance Directives:   Advance Care Flowsheet Documentation       Date/Time Healthcare Directive Type of Healthcare Directive Copy in Chart Healthcare Agent Appointed Healthcare Agent's Name Healthcare Agent's Phone Number    24 1150 No, patient does not have an advance directive for healthcare treatment -- -- -- -- --            Admitting Physician:  Zachery Izaguirre MD  PCP: No primary care provider on file.    Discharging Nurse: ***  Discharging Hospital Unit/Room#: 3AN-3328/3328-01  Discharging Unit Phone Number: ***    Emergency Contact:   Extended Emergency Contact Information  Primary Emergency Contact: Gala Brown  Address:  [address]  Home Phone: [phone]  Mobile Phone: [phone]  Relation: Spouse  Secondary Emergency Contact: Betty Josue  Home Phone: [phone]  Work Phone: [phone]  Relation: Child    Past Surgical History:  Past Surgical History:   Procedure Laterality Date    COLONOSCOPY      CORONARY ANGIOPLASTY WITH STENT PLACEMENT      ERCP N/A 2024    ENDOSCOPIC RETROGRADE CHOLANGIOPANCREATOGRAPHY STENT INSERTION performed by Jeffery Armenta MD at [facility] ENDOSCOPY    ERCP  2024    ENDOSCOPIC RETROGRADE CHOLANGIOPANCREATOGRAPHY BILIARY BRUSHING performed by Jeffery Armenta MD at [facility] ENDOSCOPY    FRACTURE SURGERY      HIP ARTHROPLASTY Left 2018    INGUINAL HERNIA REPAIR  2017    open incarcerated inguinal hernia repair with mesh     PROSTATECTOMY      pre cancerous    TESTICLE REMOVAL      TONSILLECTOMY      UPPER GASTROINTESTINAL ENDOSCOPY N/A 2024    ESOPHAGOGASTRODUODENOSCOPY BIOPSY performed by Jeffery Armenta MD at [facility] ENDOSCOPY       Immunization History:   Immunization History   Administered Date(s)

## 2024-05-31 NOTE — PROGRESS NOTES
Patient alert and oriented x 4 and up SBA with walker. Patient tolerating diet with no c/o nausea or pain at this time. Assessment completed, see flowsheet. IV antibiotics and fluids infusing per order. Patient's bed is locked and in lowest position, side rails up x 2 with an active bed alarm. Non slip socks applied. Tele sitter in place for safety.

## 2024-05-31 NOTE — PLAN OF CARE
Problem: Safety - Adult  Goal: Free from fall injury  Outcome: Progressing  Flowsheets (Taken 5/30/2024 2330)  Free From Fall Injury:   Instruct family/caregiver on patient safety   Based on caregiver fall risk screen, instruct family/caregiver to ask for assistance with transferring infant if caregiver noted to have fall risk factors     Problem: Discharge Planning  Goal: Discharge to home or other facility with appropriate resources  Outcome: Progressing  Flowsheets (Taken 5/30/2024 2330)  Discharge to home or other facility with appropriate resources: Identify barriers to discharge with patient and caregiver     Problem: Skin/Tissue Integrity  Goal: Absence of new skin breakdown  Description: 1.  Monitor for areas of redness and/or skin breakdown  2.  Assess vascular access sites hourly  3.  Every 4-6 hours minimum:  Change oxygen saturation probe site  4.  Every 4-6 hours:  If on nasal continuous positive airway pressure, respiratory therapy assess nares and determine need for appliance change or resting period.  Outcome: Progressing     Problem: ABCDS Injury Assessment  Goal: Absence of physical injury  Outcome: Progressing  Flowsheets (Taken 5/30/2024 2330)  Absence of Physical Injury: Implement safety measures based on patient assessment

## 2024-05-31 NOTE — FLOWSHEET NOTE
Patient discharged home with Betty Josue (Daughter), release letter given to daughter, IV taken out, patient taken to car in wheelchair.
